# Patient Record
Sex: MALE | Race: WHITE | NOT HISPANIC OR LATINO | URBAN - METROPOLITAN AREA
[De-identification: names, ages, dates, MRNs, and addresses within clinical notes are randomized per-mention and may not be internally consistent; named-entity substitution may affect disease eponyms.]

---

## 2018-02-25 ENCOUNTER — EMERGENCY (EMERGENCY)
Facility: HOSPITAL | Age: 68
LOS: 1 days | Discharge: HOME | End: 2018-02-25
Attending: EMERGENCY MEDICINE | Admitting: EMERGENCY MEDICINE
Payer: COMMERCIAL

## 2018-02-25 VITALS
DIASTOLIC BLOOD PRESSURE: 69 MMHG | HEART RATE: 11 BPM | OXYGEN SATURATION: 95 % | TEMPERATURE: 99 F | RESPIRATION RATE: 22 BRPM | SYSTOLIC BLOOD PRESSURE: 127 MMHG | WEIGHT: 195.11 LBS

## 2018-02-25 VITALS — RESPIRATION RATE: 15 BRPM | OXYGEN SATURATION: 95 % | TEMPERATURE: 99 F

## 2018-02-25 LAB
APPEARANCE UR: CLEAR — SIGNIFICANT CHANGE UP
BACTERIA # UR AUTO: ABNORMAL /HPF
BILIRUB UR-MCNC: NEGATIVE — SIGNIFICANT CHANGE UP
COLOR SPEC: YELLOW — SIGNIFICANT CHANGE UP
DIFF PNL FLD: ABNORMAL
FLUAV H1 2009 PAND RNA SPEC QL NAA+PROBE: DETECTED
GLUCOSE UR QL: 50 MG/DL
HYALINE CASTS # UR AUTO: ABNORMAL
KETONES UR-MCNC: NEGATIVE — SIGNIFICANT CHANGE UP
LEUKOCYTE ESTERASE UR-ACNC: NEGATIVE — SIGNIFICANT CHANGE UP
NITRITE UR-MCNC: NEGATIVE — SIGNIFICANT CHANGE UP
PH UR: 6 — SIGNIFICANT CHANGE UP (ref 5–8)
PROT UR-MCNC: 300 MG/DL
RAPID RVP RESULT: DETECTED
RBC CASTS # UR COMP ASSIST: SIGNIFICANT CHANGE UP /HPF (ref 0–2)
SP GR SPEC: 1.02 — SIGNIFICANT CHANGE UP (ref 1.01–1.02)
UROBILINOGEN FLD QL: NEGATIVE — SIGNIFICANT CHANGE UP
WBC UR QL: SIGNIFICANT CHANGE UP /HPF (ref 0–5)

## 2018-02-25 PROCEDURE — 81001 URINALYSIS AUTO W/SCOPE: CPT

## 2018-02-25 PROCEDURE — 71046 X-RAY EXAM CHEST 2 VIEWS: CPT | Mod: 26

## 2018-02-25 PROCEDURE — 94640 AIRWAY INHALATION TREATMENT: CPT

## 2018-02-25 PROCEDURE — 87581 M.PNEUMON DNA AMP PROBE: CPT

## 2018-02-25 PROCEDURE — 71046 X-RAY EXAM CHEST 2 VIEWS: CPT

## 2018-02-25 PROCEDURE — 82962 GLUCOSE BLOOD TEST: CPT

## 2018-02-25 PROCEDURE — 87633 RESP VIRUS 12-25 TARGETS: CPT

## 2018-02-25 PROCEDURE — 96372 THER/PROPH/DIAG INJ SC/IM: CPT

## 2018-02-25 PROCEDURE — 87486 CHLMYD PNEUM DNA AMP PROBE: CPT

## 2018-02-25 PROCEDURE — 99283 EMERGENCY DEPT VISIT LOW MDM: CPT | Mod: 25

## 2018-02-25 PROCEDURE — 99285 EMERGENCY DEPT VISIT HI MDM: CPT

## 2018-02-25 PROCEDURE — 87798 DETECT AGENT NOS DNA AMP: CPT

## 2018-02-25 RX ORDER — IPRATROPIUM/ALBUTEROL SULFATE 18-103MCG
3 AEROSOL WITH ADAPTER (GRAM) INHALATION ONCE
Qty: 0 | Refills: 0 | Status: COMPLETED | OUTPATIENT
Start: 2018-02-25 | End: 2018-02-25

## 2018-02-25 RX ORDER — IBUPROFEN 200 MG
600 TABLET ORAL ONCE
Qty: 0 | Refills: 0 | Status: COMPLETED | OUTPATIENT
Start: 2018-02-25 | End: 2018-02-25

## 2018-02-25 RX ORDER — ACETAMINOPHEN 500 MG
975 TABLET ORAL ONCE
Qty: 0 | Refills: 0 | Status: COMPLETED | OUTPATIENT
Start: 2018-02-25 | End: 2018-02-25

## 2018-02-25 RX ORDER — ALBUTEROL 90 UG/1
1 AEROSOL, METERED ORAL ONCE
Qty: 0 | Refills: 0 | Status: COMPLETED | OUTPATIENT
Start: 2018-02-25 | End: 2018-02-25

## 2018-02-25 RX ADMIN — Medication 3 MILLILITER(S): at 12:57

## 2018-02-25 RX ADMIN — Medication 975 MILLIGRAM(S): at 14:14

## 2018-02-25 RX ADMIN — ALBUTEROL 1 PUFF(S): 90 AEROSOL, METERED ORAL at 14:14

## 2018-02-25 RX ADMIN — Medication 100 MILLIGRAM(S): at 12:56

## 2018-02-25 RX ADMIN — Medication 600 MILLIGRAM(S): at 14:12

## 2018-02-25 RX ADMIN — Medication 600 MILLIGRAM(S): at 11:40

## 2018-02-25 RX ADMIN — Medication 75 MILLIGRAM(S): at 14:54

## 2018-02-25 NOTE — ED PROVIDER NOTE - PROGRESS NOTE DETAILS
pt found to be febrile rectally. will treat with anti-pyretics. finger stick within normal limits. following chest x-ray and UA, will reassess. Possible consolidation of RLL.  Symptoms improved with antipyretics and nebulizer.  to be discharged on doxycycline.  pmd follow-up.  return precautions given.  Advised to closely monitor FS at home and to return for elevated readings.      MARINO Sparks MD pt found to be febrile rectally. will treat with anti-pyretics. finger stick <200. following chest x-ray and UA, will reassess. Pt found to flu positive.  will treat with tamiflu.  pt approved clinically.  discussed disposition.  pt would like to be discharged with previously discussed meds. strict FS monitoring, PMD follow-up and return precautions.

## 2018-02-25 NOTE — ED ADULT NURSE NOTE - DISCHARGE TEACHING
follow up with pcp. Return for worsening s.s. Patient and son verbalized understanding of d.c instructions.

## 2018-02-25 NOTE — ED PROVIDER NOTE - MEDICAL DECISION MAKING DETAILS
68 yo m with hx of htn, hdl, dm on metformin coming in with non productive cough, congestion, diffuse myalgias, and subjective fever. found to be febrile in ED. wife at home with flu. likely viral URI. will obtain chest x-ray to r/o pneumonia. finger stick, ua, and anti pyretics and reassess.

## 2018-02-25 NOTE — ED PROVIDER NOTE - CARE PLAN
Principal Discharge DX:	PNA (pneumonia)  Assessment and plan of treatment:	Complete course of antibiotic as prescribed.  Use albuterol inhaled 1-2 puffs every 4-6 hours as needed for cough/shortness of breath.  Stay well-hydrated.  check finger sticks regulalry.  Follow-up with your doctor.  Return for new/worsening symptoms including increased weakness, lethargy, change in mental status, or any other concerns.

## 2018-02-25 NOTE — ED PROVIDER NOTE - OBJECTIVE STATEMENT
68 yo m with pmhx of dm and htn presents with cough that started 2-3 days ago. also has fever, chills, and headache. feels like he has chest congestion. denies chest pain, sob, nausea, vomiting, urinary problems. 66 yo m with pmhx of dm and htn presents with cough that started 2-3 days ago. also has subjective fever, chills, and mild frontal headache. +mylagias.  feels like he has chest congestion. +non-productive cough. denies chest pain, sob, nausea, vomiting, urinary problems.  Tylenol this morning at approx 8AM with some releif.

## 2018-02-25 NOTE — ED PROVIDER NOTE - PLAN OF CARE
Complete course of antibiotic as prescribed.  Use albuterol inhaled 1-2 puffs every 4-6 hours as needed for cough/shortness of breath.  Stay well-hydrated.  check finger sticks regulalry.  Follow-up with your doctor.  Return for new/worsening symptoms including increased weakness, lethargy, change in mental status, or any other concerns.

## 2018-02-25 NOTE — ED ADULT NURSE NOTE - OBJECTIVE STATEMENT
66 y/o male presenting to the ED via walking in complaining of body aches, intermittent fevers, and runny nose x days. Spouse diagnosed with flu. Patient denies decrease in PO intake. Patient denies chest pain, dizziness, n/v/d, urinary s.s., numbness, tingling. Positive dry and non-productive cough. Last Advil at 8am. Hx Htn and DM. a&ox3. Safety and comfort measures provided. Son at bedside.

## 2018-02-25 NOTE — ED PROVIDER NOTE - CONSTITUTIONAL, MLM
normal... Well appearing, well nourished, awake, alert, oriented to person, place, time/situation and in no apparent distress. well nourished, awake, alert, oriented to person, place, time/situation and in no apparent distress.

## 2018-09-24 ENCOUNTER — INPATIENT (INPATIENT)
Facility: HOSPITAL | Age: 68
LOS: 6 days | Discharge: HOME | DRG: 693 | End: 2018-10-01
Attending: INTERNAL MEDICINE | Admitting: INTERNAL MEDICINE
Payer: COMMERCIAL

## 2018-09-24 VITALS
OXYGEN SATURATION: 97 % | SYSTOLIC BLOOD PRESSURE: 186 MMHG | DIASTOLIC BLOOD PRESSURE: 83 MMHG | RESPIRATION RATE: 18 BRPM | HEART RATE: 98 BPM | WEIGHT: 199.96 LBS | TEMPERATURE: 99 F | HEIGHT: 68 IN

## 2018-09-24 LAB
ALBUMIN SERPL ELPH-MCNC: 4.4 G/DL — SIGNIFICANT CHANGE UP (ref 3.3–5)
ALP SERPL-CCNC: 101 U/L — SIGNIFICANT CHANGE UP (ref 40–120)
ALT FLD-CCNC: 18 U/L — SIGNIFICANT CHANGE UP (ref 10–45)
ANION GAP SERPL CALC-SCNC: 12 MMOL/L — SIGNIFICANT CHANGE UP (ref 5–17)
APPEARANCE UR: CLEAR — SIGNIFICANT CHANGE UP
AST SERPL-CCNC: 17 U/L — SIGNIFICANT CHANGE UP (ref 10–40)
BACTERIA # UR AUTO: NEGATIVE — SIGNIFICANT CHANGE UP
BASOPHILS # BLD AUTO: 0.1 K/UL — SIGNIFICANT CHANGE UP (ref 0–0.2)
BASOPHILS NFR BLD AUTO: 0.8 % — SIGNIFICANT CHANGE UP (ref 0–2)
BILIRUB SERPL-MCNC: 0.3 MG/DL — SIGNIFICANT CHANGE UP (ref 0.2–1.2)
BILIRUB UR-MCNC: NEGATIVE — SIGNIFICANT CHANGE UP
BUN SERPL-MCNC: 17 MG/DL — SIGNIFICANT CHANGE UP (ref 7–23)
CALCIUM SERPL-MCNC: 9.7 MG/DL — SIGNIFICANT CHANGE UP (ref 8.4–10.5)
CHLORIDE SERPL-SCNC: 104 MMOL/L — SIGNIFICANT CHANGE UP (ref 96–108)
CO2 SERPL-SCNC: 22 MMOL/L — SIGNIFICANT CHANGE UP (ref 22–31)
COLOR SPEC: SIGNIFICANT CHANGE UP
CREAT SERPL-MCNC: 0.91 MG/DL — SIGNIFICANT CHANGE UP (ref 0.5–1.3)
DIFF PNL FLD: ABNORMAL
EOSINOPHIL # BLD AUTO: 0.1 K/UL — SIGNIFICANT CHANGE UP (ref 0–0.5)
EOSINOPHIL NFR BLD AUTO: 0.6 % — SIGNIFICANT CHANGE UP (ref 0–6)
EPI CELLS # UR: 0 /HPF — SIGNIFICANT CHANGE UP
GAS PNL BLDV: SIGNIFICANT CHANGE UP
GLUCOSE SERPL-MCNC: 186 MG/DL — HIGH (ref 70–99)
GLUCOSE UR QL: ABNORMAL
HCT VFR BLD CALC: 39.2 % — SIGNIFICANT CHANGE UP (ref 39–50)
HGB BLD-MCNC: 12.9 G/DL — LOW (ref 13–17)
HYALINE CASTS # UR AUTO: 0 /LPF — SIGNIFICANT CHANGE UP (ref 0–2)
KETONES UR-MCNC: NEGATIVE — SIGNIFICANT CHANGE UP
LEUKOCYTE ESTERASE UR-ACNC: NEGATIVE — SIGNIFICANT CHANGE UP
LYMPHOCYTES # BLD AUTO: 1.7 K/UL — SIGNIFICANT CHANGE UP (ref 1–3.3)
LYMPHOCYTES # BLD AUTO: 14.4 % — SIGNIFICANT CHANGE UP (ref 13–44)
MCHC RBC-ENTMCNC: 29.6 PG — SIGNIFICANT CHANGE UP (ref 27–34)
MCHC RBC-ENTMCNC: 33 GM/DL — SIGNIFICANT CHANGE UP (ref 32–36)
MCV RBC AUTO: 89.9 FL — SIGNIFICANT CHANGE UP (ref 80–100)
MONOCYTES # BLD AUTO: 0.8 K/UL — SIGNIFICANT CHANGE UP (ref 0–0.9)
MONOCYTES NFR BLD AUTO: 7.3 % — SIGNIFICANT CHANGE UP (ref 2–14)
NEUTROPHILS # BLD AUTO: 8.8 K/UL — HIGH (ref 1.8–7.4)
NEUTROPHILS NFR BLD AUTO: 76.9 % — SIGNIFICANT CHANGE UP (ref 43–77)
NITRITE UR-MCNC: NEGATIVE — SIGNIFICANT CHANGE UP
PH UR: 6 — SIGNIFICANT CHANGE UP (ref 5–8)
PLATELET # BLD AUTO: 304 K/UL — SIGNIFICANT CHANGE UP (ref 150–400)
POTASSIUM SERPL-MCNC: 4.4 MMOL/L — SIGNIFICANT CHANGE UP (ref 3.5–5.3)
POTASSIUM SERPL-SCNC: 4.4 MMOL/L — SIGNIFICANT CHANGE UP (ref 3.5–5.3)
PROT SERPL-MCNC: 7.8 G/DL — SIGNIFICANT CHANGE UP (ref 6–8.3)
PROT UR-MCNC: ABNORMAL
RBC # BLD: 4.36 M/UL — SIGNIFICANT CHANGE UP (ref 4.2–5.8)
RBC # FLD: 12.8 % — SIGNIFICANT CHANGE UP (ref 10.3–14.5)
RBC CASTS # UR COMP ASSIST: 31 /HPF — HIGH (ref 0–4)
SODIUM SERPL-SCNC: 138 MMOL/L — SIGNIFICANT CHANGE UP (ref 135–145)
SP GR SPEC: 1.02 — SIGNIFICANT CHANGE UP
UROBILINOGEN FLD QL: NEGATIVE — SIGNIFICANT CHANGE UP
WBC # BLD: 11.5 K/UL — HIGH (ref 3.8–10.5)
WBC # FLD AUTO: 11.5 K/UL — HIGH (ref 3.8–10.5)
WBC UR QL: 2 /HPF — SIGNIFICANT CHANGE UP (ref 0–5)

## 2018-09-24 PROCEDURE — 99218: CPT

## 2018-09-24 RX ORDER — MORPHINE SULFATE 50 MG/1
4 CAPSULE, EXTENDED RELEASE ORAL ONCE
Qty: 0 | Refills: 0 | Status: DISCONTINUED | OUTPATIENT
Start: 2018-09-24 | End: 2018-09-24

## 2018-09-24 RX ORDER — TAMSULOSIN HYDROCHLORIDE 0.4 MG/1
0.4 CAPSULE ORAL AT BEDTIME
Qty: 0 | Refills: 0 | Status: DISCONTINUED | OUTPATIENT
Start: 2018-09-24 | End: 2018-10-01

## 2018-09-24 RX ORDER — METOCLOPRAMIDE HCL 10 MG
5 TABLET ORAL ONCE
Qty: 0 | Refills: 0 | Status: COMPLETED | OUTPATIENT
Start: 2018-09-24 | End: 2018-09-24

## 2018-09-24 RX ORDER — KETOROLAC TROMETHAMINE 30 MG/ML
15 SYRINGE (ML) INJECTION ONCE
Qty: 0 | Refills: 0 | Status: DISCONTINUED | OUTPATIENT
Start: 2018-09-24 | End: 2018-09-24

## 2018-09-24 RX ORDER — ONDANSETRON 8 MG/1
4 TABLET, FILM COATED ORAL ONCE
Qty: 0 | Refills: 0 | Status: DISCONTINUED | OUTPATIENT
Start: 2018-09-24 | End: 2018-09-24

## 2018-09-24 RX ORDER — SODIUM CHLORIDE 9 MG/ML
3 INJECTION INTRAMUSCULAR; INTRAVENOUS; SUBCUTANEOUS EVERY 8 HOURS
Qty: 0 | Refills: 0 | Status: DISCONTINUED | OUTPATIENT
Start: 2018-09-24 | End: 2018-10-01

## 2018-09-24 RX ORDER — ONDANSETRON 8 MG/1
4 TABLET, FILM COATED ORAL ONCE
Qty: 0 | Refills: 0 | Status: COMPLETED | OUTPATIENT
Start: 2018-09-24 | End: 2018-09-24

## 2018-09-24 RX ORDER — SODIUM CHLORIDE 9 MG/ML
1000 INJECTION INTRAMUSCULAR; INTRAVENOUS; SUBCUTANEOUS ONCE
Qty: 0 | Refills: 0 | Status: COMPLETED | OUTPATIENT
Start: 2018-09-24 | End: 2018-09-24

## 2018-09-24 RX ADMIN — MORPHINE SULFATE 4 MILLIGRAM(S): 50 CAPSULE, EXTENDED RELEASE ORAL at 21:00

## 2018-09-24 RX ADMIN — MORPHINE SULFATE 4 MILLIGRAM(S): 50 CAPSULE, EXTENDED RELEASE ORAL at 22:34

## 2018-09-24 RX ADMIN — MORPHINE SULFATE 4 MILLIGRAM(S): 50 CAPSULE, EXTENDED RELEASE ORAL at 21:38

## 2018-09-24 RX ADMIN — SODIUM CHLORIDE 1000 MILLILITER(S): 9 INJECTION INTRAMUSCULAR; INTRAVENOUS; SUBCUTANEOUS at 20:31

## 2018-09-24 RX ADMIN — ONDANSETRON 4 MILLIGRAM(S): 8 TABLET, FILM COATED ORAL at 20:31

## 2018-09-24 RX ADMIN — MORPHINE SULFATE 4 MILLIGRAM(S): 50 CAPSULE, EXTENDED RELEASE ORAL at 20:31

## 2018-09-24 RX ADMIN — TAMSULOSIN HYDROCHLORIDE 0.4 MILLIGRAM(S): 0.4 CAPSULE ORAL at 23:55

## 2018-09-24 RX ADMIN — MORPHINE SULFATE 4 MILLIGRAM(S): 50 CAPSULE, EXTENDED RELEASE ORAL at 21:07

## 2018-09-24 RX ADMIN — Medication 5 MILLIGRAM(S): at 22:34

## 2018-09-24 RX ADMIN — Medication 15 MILLIGRAM(S): at 22:35

## 2018-09-24 NOTE — ED CDU PROVIDER INITIAL DAY NOTE - OBJECTIVE STATEMENT
68M HTN, HLD, DM h/o renal stones requiring a stent and lithotripsy p/w flank pain for 1 day. Pt reports last night he began to feel and sore/sharp flank pain in his L flank with radiation to his groin. Pain waxes and wanes in intensity, currently 8/10. Took tylenol at 12pm advil at 4pm w/o relief. Reports some subjective fevers in the afternoon and also associated nausea. No CP, SOB, no dysuria or urgency. 68M HTN, HLD, DM h/o renal stones requiring a stent and lithotripsy p/w flank pain for 1 day. Pt reports last night he began to feel and sore/sharp flank pain in his L flank with radiation to his groin. Pain waxes and wanes in intensity, currently 8/10. Took tylenol at 12pm advil at 4pm w/o relief. Reports some subjective fevers in the afternoon and also associated nausea. No CP, SOB, no dysuria or urgency.  In ED, patient had laboratory significant for mild leukocytosis 11.5, UA with blood and Abdominal/pelvis CT showing Mild left hydroureteronephrosis and perinephric fat stranding caused by an approximately 5 x 3 mm stone located in the distal left ureter, immediately proximal to the uterovesical junction. Pt was evaluated by Urology and sent to CDU for pain control, IVF, Flomax, frequent evaluation, repeat laboratory, NPO for possible stent placement.

## 2018-09-24 NOTE — ED PROVIDER NOTE - ATTENDING CONTRIBUTION TO CARE
Private Physician [facility]  68y male pmh HTN,HLD,DMT2, Renal colic. pt comes to ed complains of pain left flank cw prior renal stone. Nausea with dry heaves, no hematuria, felt warm without fever, pain no rad, Onset last night. 5/10, PE WDWN male looking acutely ill normocephalic atraumatic chest clear cv no rubs, gallops or murmurs,abd left cvat mod, no ant abd ttp, no rebound guarding, neuro no focal defects. cv no rmg  Bautista Negron MD, Facep

## 2018-09-24 NOTE — ED PROVIDER NOTE - OBJECTIVE STATEMENT
68M HTN, HLD, DM h/o renal stones requiring a stent and lithotripsy p/w flank pain for 1 day. Pt reports last night he began to feel and sore/sharp flank pain in his L flank with radiation to his groin. Pain waxes and wanes in intensity, currently 8/10. Took tylenol at 12pm advil at 4pm w/o relief. Reports some subjective fevers in the afternoon and also associated nausea. No CP, SOB, no dysuria or urgency.

## 2018-09-24 NOTE — ED PROVIDER NOTE - NS ED ROS FT
Constitution: +subjective fevers   Eyes: No visual changes  HEENT: No URI symptoms  Cardio: No Chest pain  Resp: No SOB  GI: + nausea, no abdominal pain  : +flank pain, no dysuria  MSK: No Back pain  Neuro: No Headache  Skin: No rashes  Chauncey Collins, PGY-1

## 2018-09-24 NOTE — ED PROVIDER NOTE - MEDICAL DECISION MAKING DETAILS
Flank pain with hx of renal colic ro return, Check labs,ct stone hunt, labs, re-asssess  Bautista Negron MD, Facep

## 2018-09-24 NOTE — CONSULT NOTE ADULT - ASSESSMENT
renal colic secondary to 5mm left distal ureteral calculi    Rec:  -observe in cdu  -pain control  -flomax 0.4mg qd  -strain urine  -send UCx  -npo after midnight  -repeat labs in am  -will reevaluate on am rounds (~6a-7a)  discussed with urology attending G. Goldberg MD

## 2018-09-24 NOTE — ED ADULT NURSE NOTE - OBJECTIVE STATEMENT
67 yo M pmh of HTN, DM, renal calculi with lithotripsy in the past came to ED c/o left sided flank pain with associated nausea x 1 day.  Pt states that it feels like the last time he had a kidney stone.  Pt has complaints of polyuria as well.   Denies chest pain, back pain, SOB, fevers/chills, v/d, lightheadedness, dizziness, changes in bowel habits.  A&Ox4, abdomen obese, nondistended, nontender, bowel sounds present.  Skin w/d/i.  VSS, pt hypertensive, MD aware.  +peripheral pulses.  Safety and comfort maintained. Wife present at bedside. Will continue to monitor.

## 2018-09-24 NOTE — ED ADULT TRIAGE NOTE - CHIEF COMPLAINT QUOTE
Pt has hx kidney stones, always on the L, x over 20 years, once required lithotripsy.  Pt c/o L flank pain, fevers, chills, nausea since yesterday at midnight. Pt has been trying to increase his water intake to "flush it out."  Pt also c/o urinary frequency. No burning, dysuria.  Pt has hx HTN, did not take his BP meds today due to nausea.

## 2018-09-24 NOTE — ED PROVIDER NOTE - PHYSICAL EXAMINATION
General: Alert and Orientated x 3. No apparent distress.  HEENT: Normocephalic and atraumatic, PERRL with EOMI, Trachea midline.   Cardiac: Normal S1 and S2 w/ RRR. No murmurs appreciated. No JVD appreciated.  Pulmonary: Vesicular breath sounds bilaterally. No increased WOB. No wheezes or crackles.  Abdominal: Soft, non-tender. (+) bowel sounds appreciated in all 4 quadrants. No hepatosplenomegaly.   : L CVA tenderness   Neurologic: No focal sensory or motor deficits.  Musculoskeletal: Strength appropriate in all 4 extremities for age with no limited ROM.  Vascular: Distal pulses 2+ in lower extremity   Skin: Color appropriate for race. Intact, warm, and well-perfused.  Psychiatric: Appropriate mood and affect. No apparent risk to self or others.  Chauncey Collins, PGY-1

## 2018-09-24 NOTE — ED CDU PROVIDER INITIAL DAY NOTE - OBSERVATION MONITORING PLAN
ED Record Reviewed PMD Contacted/ED Record Reviewed PMD Contacted/ED Record Reviewed/Urologist Dr. Sultan Cooper

## 2018-09-24 NOTE — CONSULT NOTE ADULT - SUBJECTIVE AND OBJECTIVE BOX
HPI:  Patient is a 68y Male PMHx DM, nephrolithiasis presents to ED c/o left flank pain x 1 day.   pt with hx of kidney stones, followed by [facility] ([phone]).  has passed a stone spontaneously as well as needed surgical intervention in the past.  No fever, +chills, +nausea, no emesis.  Denies dysuria +frequency      PAST MEDICAL & SURGICAL HISTORY:  Renal colic  Hypertension  Hypercholesteremia  Diabetes    No Known Allergies    Intolerances    REVIEW OF SYSTEMS: Pertinent positives and negatives as stated in HPI, otherwise negative    Vital signs  T(C): 37.1 (18 @ 18:53), Max: 37.1 (18 @ 18:53)  HR: 69 (18 @ 22:32)  BP: 180/83 (18 @ 22:32)  SpO2: 98% (18 @ 22:32)      Physical Exam  Gen: NAD  Pulm: No intercostal retractions  Back: No CVAT b/l  Abd: Soft, NT, ND  : wnl    LABS:     @ 20:45    WBC 11.5  / Hct 39.2  / SCr 0.91         138  |  104  |  17  ----------------------------<  186<H>  4.4   |  22  |  0.91    Ca    9.7      24 Sep 2018 20:45    TPro  7.8  /  Alb  4.4  /  TBili  0.3  /  DBili  x   /  AST  17  /  ALT  18  /  AlkPhos  101        Urinalysis Basic - ( 24 Sep 2018 20:45 )    Color: Light Yellow / Appearance: Clear / S.016 / pH: x  Gluc: x / Ketone: Negative  / Bili: Negative / Urobili: Negative   Blood: x / Protein: 30 mg/dL / Nitrite: Negative   Leuk Esterase: Negative / RBC: 31 /hpf / WBC 2 /hpf   Sq Epi: x / Non Sq Epi: 0 /hpf / Bacteria: Negative      RADIOLOGY:    EXAM:  CT ABDOMEN AND PELVIS                          PROCEDURE DATE:  2018        INTERPRETATION:  CLINICAL INFORMATION:  Left flank pain.  History of   renal stones.    TECHNIQUE:   Axial CT images were acquired through the abdomen and pelvis   Intravenous contrast:  None.  Oral contrast: None.   Coronal and sagittal reformats were generated.     COMPARISON STUDY:  2016.    FINDINGS:  Visualized lower chest: Atherosclerotic calcification of the coronary   arteries.  Mild bilateral gynecomastia.    Liver: Right hepatic lobe measures 21-20 cm in length, unchanged from   2016.  Bile ducts: Normal caliber.  Gallbladder: Within normal limits.  Spleen: Within normal limits.  Pancreas: Within normal limits.  Adrenal glands: Within normal limits.  Kidneys/ureters: Mild left hydroureteronephrosis and perinephric fat   stranding caused by an approximately 5 x 3 mm stone located in the distal   left ureter, immediately proximal to uterovesical junction.  A 3 mm   calcific focus in the right lower pole (2:56) likely reflects a   nonobstructing renal stone.    Bladder: Within normal limits.  Reproductive organs: The prostate measures approximately 4.3 x 5.5 x 4.5   cm.      Bowel: Small hiatal hernia.  No bowel obstruction.  Normal appendix.    Mild sigmoid diverticulosis.  Peritoneum: No ascites.    Retroperitoneum: Lymphadenopathy.  Vasculature: Scattered atherosclerotic calcifications of the aortoiliac   tree.    Abdominal wall/soft tissues: Within normal limits.  Bones: An approximately 3.2 x 1.1 cm sclerotic focus in the right ilium   (2:91) is grossly stable from 2016.  Degenerative changes in the   spine; no clinically significant spinal canal stenosis is visualized by   CT technique.  Mild bilateral neural foraminal narrowing at L2-L3 through   L5-S1.      IMPRESSION:  Mild left hydroureteronephrosis and perinephric fat stranding caused by   an approximately 5 x 3 mm stone located in the distal left ureter,   immediately proximal to the uterovesical junction.  Superimposed   genitourinary infection should be excluded based on clinical symptoms and   laboratory values.      KAREN LOPEZ M.D.,ATTENDING RADIOLOGIST  This document has been electronically signed. Sep 24 2018  8:41PM

## 2018-09-24 NOTE — ED CDU PROVIDER INITIAL DAY NOTE - ATTENDING CONTRIBUTION TO CARE
I have personally performed a face to face diagnostic evaluation on this patient.  I have reviewed the ACP note and agree with the history, exam, and plan of care, except as noted.  History and Exam by me shows  See ED provider note  Bautista Negron MD, Facep

## 2018-09-25 DIAGNOSIS — N23 UNSPECIFIED RENAL COLIC: ICD-10-CM

## 2018-09-25 DIAGNOSIS — I10 ESSENTIAL (PRIMARY) HYPERTENSION: ICD-10-CM

## 2018-09-25 DIAGNOSIS — E78.00 PURE HYPERCHOLESTEROLEMIA, UNSPECIFIED: ICD-10-CM

## 2018-09-25 DIAGNOSIS — I25.10 ATHEROSCLEROTIC HEART DISEASE OF NATIVE CORONARY ARTERY WITHOUT ANGINA PECTORIS: ICD-10-CM

## 2018-09-25 DIAGNOSIS — N20.1 CALCULUS OF URETER: ICD-10-CM

## 2018-09-25 DIAGNOSIS — E11.9 TYPE 2 DIABETES MELLITUS WITHOUT COMPLICATIONS: ICD-10-CM

## 2018-09-25 LAB
ANION GAP SERPL CALC-SCNC: 9 MMOL/L — SIGNIFICANT CHANGE UP (ref 5–17)
BASOPHILS # BLD AUTO: 0 K/UL — SIGNIFICANT CHANGE UP (ref 0–0.2)
BASOPHILS NFR BLD AUTO: 0.3 % — SIGNIFICANT CHANGE UP (ref 0–2)
BUN SERPL-MCNC: 17 MG/DL — SIGNIFICANT CHANGE UP (ref 7–23)
CALCIUM SERPL-MCNC: 9.1 MG/DL — SIGNIFICANT CHANGE UP (ref 8.4–10.5)
CHLORIDE SERPL-SCNC: 106 MMOL/L — SIGNIFICANT CHANGE UP (ref 96–108)
CO2 SERPL-SCNC: 24 MMOL/L — SIGNIFICANT CHANGE UP (ref 22–31)
CREAT SERPL-MCNC: 1.02 MG/DL — SIGNIFICANT CHANGE UP (ref 0.5–1.3)
EOSINOPHIL # BLD AUTO: 0.1 K/UL — SIGNIFICANT CHANGE UP (ref 0–0.5)
EOSINOPHIL NFR BLD AUTO: 0.6 % — SIGNIFICANT CHANGE UP (ref 0–6)
GLUCOSE BLDC GLUCOMTR-MCNC: 136 MG/DL — HIGH (ref 70–99)
GLUCOSE BLDC GLUCOMTR-MCNC: 146 MG/DL — HIGH (ref 70–99)
GLUCOSE BLDC GLUCOMTR-MCNC: 150 MG/DL — HIGH (ref 70–99)
GLUCOSE BLDC GLUCOMTR-MCNC: 153 MG/DL — HIGH (ref 70–99)
GLUCOSE BLDC GLUCOMTR-MCNC: 197 MG/DL — HIGH (ref 70–99)
GLUCOSE SERPL-MCNC: 141 MG/DL — HIGH (ref 70–99)
HCT VFR BLD CALC: 34.4 % — LOW (ref 39–50)
HGB BLD-MCNC: 11.3 G/DL — LOW (ref 13–17)
LYMPHOCYTES # BLD AUTO: 1.4 K/UL — SIGNIFICANT CHANGE UP (ref 1–3.3)
LYMPHOCYTES # BLD AUTO: 15.5 % — SIGNIFICANT CHANGE UP (ref 13–44)
MCHC RBC-ENTMCNC: 30 PG — SIGNIFICANT CHANGE UP (ref 27–34)
MCHC RBC-ENTMCNC: 33 GM/DL — SIGNIFICANT CHANGE UP (ref 32–36)
MCV RBC AUTO: 91.1 FL — SIGNIFICANT CHANGE UP (ref 80–100)
MONOCYTES # BLD AUTO: 0.7 K/UL — SIGNIFICANT CHANGE UP (ref 0–0.9)
MONOCYTES NFR BLD AUTO: 8 % — SIGNIFICANT CHANGE UP (ref 2–14)
NEUTROPHILS # BLD AUTO: 6.9 K/UL — SIGNIFICANT CHANGE UP (ref 1.8–7.4)
NEUTROPHILS NFR BLD AUTO: 75.5 % — SIGNIFICANT CHANGE UP (ref 43–77)
PLATELET # BLD AUTO: 232 K/UL — SIGNIFICANT CHANGE UP (ref 150–400)
POTASSIUM SERPL-MCNC: 4.2 MMOL/L — SIGNIFICANT CHANGE UP (ref 3.5–5.3)
POTASSIUM SERPL-SCNC: 4.2 MMOL/L — SIGNIFICANT CHANGE UP (ref 3.5–5.3)
RBC # BLD: 3.77 M/UL — LOW (ref 4.2–5.8)
RBC # FLD: 12.6 % — SIGNIFICANT CHANGE UP (ref 10.3–14.5)
SODIUM SERPL-SCNC: 139 MMOL/L — SIGNIFICANT CHANGE UP (ref 135–145)
WBC # BLD: 9.1 K/UL — SIGNIFICANT CHANGE UP (ref 3.8–10.5)
WBC # FLD AUTO: 9.1 K/UL — SIGNIFICANT CHANGE UP (ref 3.8–10.5)

## 2018-09-25 PROCEDURE — 99217: CPT

## 2018-09-25 RX ORDER — DEXTROSE 50 % IN WATER 50 %
25 SYRINGE (ML) INTRAVENOUS ONCE
Qty: 0 | Refills: 0 | Status: DISCONTINUED | OUTPATIENT
Start: 2018-09-25 | End: 2018-10-01

## 2018-09-25 RX ORDER — INSULIN LISPRO 100/ML
VIAL (ML) SUBCUTANEOUS AT BEDTIME
Qty: 0 | Refills: 0 | Status: DISCONTINUED | OUTPATIENT
Start: 2018-09-25 | End: 2018-10-01

## 2018-09-25 RX ORDER — ASPIRIN/CALCIUM CARB/MAGNESIUM 324 MG
81 TABLET ORAL DAILY
Qty: 0 | Refills: 0 | Status: DISCONTINUED | OUTPATIENT
Start: 2018-09-25 | End: 2018-10-01

## 2018-09-25 RX ORDER — ASPIRIN/CALCIUM CARB/MAGNESIUM 324 MG
0 TABLET ORAL
Qty: 0 | Refills: 0 | COMMUNITY

## 2018-09-25 RX ORDER — ACETAMINOPHEN 500 MG
650 TABLET ORAL EVERY 6 HOURS
Qty: 0 | Refills: 0 | Status: DISCONTINUED | OUTPATIENT
Start: 2018-09-25 | End: 2018-10-01

## 2018-09-25 RX ORDER — ATORVASTATIN CALCIUM 80 MG/1
80 TABLET, FILM COATED ORAL AT BEDTIME
Qty: 0 | Refills: 0 | Status: DISCONTINUED | OUTPATIENT
Start: 2018-09-25 | End: 2018-10-01

## 2018-09-25 RX ORDER — METFORMIN HYDROCHLORIDE 850 MG/1
1000 TABLET ORAL
Qty: 0 | Refills: 0 | Status: DISCONTINUED | OUTPATIENT
Start: 2018-09-25 | End: 2018-09-25

## 2018-09-25 RX ORDER — OXYCODONE AND ACETAMINOPHEN 5; 325 MG/1; MG/1
1 TABLET ORAL EVERY 4 HOURS
Qty: 0 | Refills: 0 | Status: DISCONTINUED | OUTPATIENT
Start: 2018-09-25 | End: 2018-10-01

## 2018-09-25 RX ORDER — INSULIN LISPRO 100/ML
VIAL (ML) SUBCUTANEOUS
Qty: 0 | Refills: 0 | Status: DISCONTINUED | OUTPATIENT
Start: 2018-09-25 | End: 2018-10-01

## 2018-09-25 RX ORDER — GLUCAGON INJECTION, SOLUTION 0.5 MG/.1ML
1 INJECTION, SOLUTION SUBCUTANEOUS ONCE
Qty: 0 | Refills: 0 | Status: DISCONTINUED | OUTPATIENT
Start: 2018-09-25 | End: 2018-10-01

## 2018-09-25 RX ORDER — LISINOPRIL 2.5 MG/1
40 TABLET ORAL DAILY
Qty: 0 | Refills: 0 | Status: DISCONTINUED | OUTPATIENT
Start: 2018-09-25 | End: 2018-10-01

## 2018-09-25 RX ORDER — DEXTROSE 50 % IN WATER 50 %
15 SYRINGE (ML) INTRAVENOUS ONCE
Qty: 0 | Refills: 0 | Status: DISCONTINUED | OUTPATIENT
Start: 2018-09-25 | End: 2018-10-01

## 2018-09-25 RX ORDER — SODIUM CHLORIDE 9 MG/ML
1000 INJECTION INTRAMUSCULAR; INTRAVENOUS; SUBCUTANEOUS
Qty: 0 | Refills: 0 | Status: DISCONTINUED | OUTPATIENT
Start: 2018-09-25 | End: 2018-09-27

## 2018-09-25 RX ORDER — MORPHINE SULFATE 50 MG/1
2 CAPSULE, EXTENDED RELEASE ORAL EVERY 4 HOURS
Qty: 0 | Refills: 0 | Status: DISCONTINUED | OUTPATIENT
Start: 2018-09-25 | End: 2018-10-01

## 2018-09-25 RX ORDER — INFLUENZA VIRUS VACCINE 15; 15; 15; 15 UG/.5ML; UG/.5ML; UG/.5ML; UG/.5ML
0.5 SUSPENSION INTRAMUSCULAR ONCE
Qty: 0 | Refills: 0 | Status: COMPLETED | OUTPATIENT
Start: 2018-09-25 | End: 2018-10-01

## 2018-09-25 RX ORDER — DEXTROSE 50 % IN WATER 50 %
12.5 SYRINGE (ML) INTRAVENOUS ONCE
Qty: 0 | Refills: 0 | Status: DISCONTINUED | OUTPATIENT
Start: 2018-09-25 | End: 2018-10-01

## 2018-09-25 RX ORDER — AMLODIPINE BESYLATE 2.5 MG/1
0 TABLET ORAL
Qty: 0 | Refills: 0 | COMMUNITY

## 2018-09-25 RX ORDER — ONDANSETRON 8 MG/1
4 TABLET, FILM COATED ORAL ONCE
Qty: 0 | Refills: 0 | Status: COMPLETED | OUTPATIENT
Start: 2018-09-25 | End: 2018-09-25

## 2018-09-25 RX ORDER — SODIUM CHLORIDE 9 MG/ML
1000 INJECTION, SOLUTION INTRAVENOUS
Qty: 0 | Refills: 0 | Status: DISCONTINUED | OUTPATIENT
Start: 2018-09-25 | End: 2018-10-01

## 2018-09-25 RX ORDER — METOPROLOL TARTRATE 50 MG
25 TABLET ORAL DAILY
Qty: 0 | Refills: 0 | Status: DISCONTINUED | OUTPATIENT
Start: 2018-09-25 | End: 2018-10-01

## 2018-09-25 RX ORDER — GLIMEPIRIDE 1 MG
1 TABLET ORAL
Qty: 0 | Refills: 0 | COMMUNITY

## 2018-09-25 RX ORDER — AMLODIPINE BESYLATE 2.5 MG/1
10 TABLET ORAL DAILY
Qty: 0 | Refills: 0 | Status: DISCONTINUED | OUTPATIENT
Start: 2018-09-25 | End: 2018-10-01

## 2018-09-25 RX ADMIN — OXYCODONE AND ACETAMINOPHEN 1 TABLET(S): 5; 325 TABLET ORAL at 14:58

## 2018-09-25 RX ADMIN — LISINOPRIL 40 MILLIGRAM(S): 2.5 TABLET ORAL at 12:55

## 2018-09-25 RX ADMIN — SODIUM CHLORIDE 3 MILLILITER(S): 9 INJECTION INTRAMUSCULAR; INTRAVENOUS; SUBCUTANEOUS at 21:03

## 2018-09-25 RX ADMIN — OXYCODONE AND ACETAMINOPHEN 1 TABLET(S): 5; 325 TABLET ORAL at 12:54

## 2018-09-25 RX ADMIN — SODIUM CHLORIDE 3 MILLILITER(S): 9 INJECTION INTRAMUSCULAR; INTRAVENOUS; SUBCUTANEOUS at 05:52

## 2018-09-25 RX ADMIN — METFORMIN HYDROCHLORIDE 1000 MILLIGRAM(S): 850 TABLET ORAL at 09:06

## 2018-09-25 RX ADMIN — SODIUM CHLORIDE 200 MILLILITER(S): 9 INJECTION INTRAMUSCULAR; INTRAVENOUS; SUBCUTANEOUS at 00:32

## 2018-09-25 RX ADMIN — TAMSULOSIN HYDROCHLORIDE 0.4 MILLIGRAM(S): 0.4 CAPSULE ORAL at 21:02

## 2018-09-25 RX ADMIN — ONDANSETRON 4 MILLIGRAM(S): 8 TABLET, FILM COATED ORAL at 13:00

## 2018-09-25 RX ADMIN — METFORMIN HYDROCHLORIDE 1000 MILLIGRAM(S): 850 TABLET ORAL at 01:34

## 2018-09-25 RX ADMIN — AMLODIPINE BESYLATE 10 MILLIGRAM(S): 2.5 TABLET ORAL at 12:54

## 2018-09-25 RX ADMIN — ATORVASTATIN CALCIUM 80 MILLIGRAM(S): 80 TABLET, FILM COATED ORAL at 21:02

## 2018-09-25 RX ADMIN — LISINOPRIL 40 MILLIGRAM(S): 2.5 TABLET ORAL at 01:34

## 2018-09-25 RX ADMIN — SODIUM CHLORIDE 200 MILLILITER(S): 9 INJECTION INTRAMUSCULAR; INTRAVENOUS; SUBCUTANEOUS at 06:18

## 2018-09-25 RX ADMIN — SODIUM CHLORIDE 3 MILLILITER(S): 9 INJECTION INTRAMUSCULAR; INTRAVENOUS; SUBCUTANEOUS at 15:12

## 2018-09-25 RX ADMIN — Medication 25 MILLIGRAM(S): at 09:05

## 2018-09-25 RX ADMIN — AMLODIPINE BESYLATE 10 MILLIGRAM(S): 2.5 TABLET ORAL at 01:34

## 2018-09-25 NOTE — ED CDU PROVIDER SUBSEQUENT DAY NOTE - PROGRESS NOTE DETAILS
Case discussed with patient's Urologist Dr. Sultan Copoer made aware and agrees with plan. Pt resting, reports mild discomfort to left flank, declines pain medication. Will continue to monitor, IVF, over night. Pt resting comfortably, reports mild discomfort left flank, declines pain medication.   Will continue to monitor. Pt resting comfortably. NAD. No complaints. VSS. pt without pain, discussed admission per urology under Dr. Salazar, NPO at midnight incase of stent placement.

## 2018-09-25 NOTE — CONSULT NOTE ADULT - PROBLEM SELECTOR RECOMMENDATION 9
-Afebrile. UA neg for nitr. Can proceed with expulsive therapy for now  -Flomax 0.4mg daily  -Pain control  -Aggressive hydration  -Ambulation  -Admit to Dr. Salazar for medical expulsive management of stone and pain control  -Urology to reassess tomorrow--if refractory pain, will discuss renal decompression with cystoscopy and left ureteral stent placement  -If pt becomes febrile, please page urology

## 2018-09-25 NOTE — H&P ADULT - ASSESSMENT
68 m with  L kidney stone, Renal colic -IVF, pain control.  Urology follow.  Diabetes  - BS control  Hypercholesteremia  - continue meds  Hypertension -continue meds  Further action as per clinical course   Nils Salazar MD pager [phone]

## 2018-09-25 NOTE — CONSULT NOTE ADULT - SUBJECTIVE AND OBJECTIVE BOX
CHIEF COMPLAINT: Flank Pain     HISTORY OF PRESENT ILLNESS:  67 yo male with h/o renal stones requiring a stent and lithotripsy p/w flank pain for 1 day. Pt reports last night he began to feel and sore/sharp flank pain in his L flank with radiation to his groin. Pain waxes and wanes in intensity, currently 8/10. Took tylenol at 12pm advil at 4pm w/o relief. Reports some subjective fevers in the afternoon and also associated nausea. No CP, SOB, no dysuria or urgency.  CT revealed coronary calcification. He states to have exertional shortness of breath. He had a treadmill stress test this past summer with no further workup.       PAST MEDICAL & SURGICAL HISTORY:  Renal colic  Hypertension  Hypercholesteremia  Diabetes          MEDICATIONS:  amLODIPine   Tablet 10 milliGRAM(s) Oral daily  aspirin  chewable 81 milliGRAM(s) Oral daily  lisinopril 40 milliGRAM(s) Oral daily  metoprolol succinate ER 25 milliGRAM(s) Oral daily  tamsulosin 0.4 milliGRAM(s) Oral at bedtime        acetaminophen   Tablet .. 650 milliGRAM(s) Oral every 6 hours PRN  morphine  - Injectable 2 milliGRAM(s) IV Push every 4 hours PRN  oxyCODONE    5 mG/acetaminophen 325 mG 1 Tablet(s) Oral every 4 hours PRN      atorvastatin 80 milliGRAM(s) Oral at bedtime  dextrose 40% Gel 15 Gram(s) Oral once PRN  dextrose 50% Injectable 12.5 Gram(s) IV Push once  dextrose 50% Injectable 25 Gram(s) IV Push once  dextrose 50% Injectable 25 Gram(s) IV Push once  glucagon  Injectable 1 milliGRAM(s) IntraMuscular once PRN  insulin lispro (HumaLOG) corrective regimen sliding scale   SubCutaneous three times a day before meals  insulin lispro (HumaLOG) corrective regimen sliding scale   SubCutaneous at bedtime    dextrose 5%. 1000 milliLiter(s) IV Continuous <Continuous>  influenza   Vaccine 0.5 milliLiter(s) IntraMuscular once  sodium chloride 0.9% lock flush 3 milliLiter(s) IV Push every 8 hours  sodium chloride 0.9%. 1000 milliLiter(s) IV Continuous <Continuous>      FAMILY HISTORY:      SOCIAL HISTORY:    [ ] Non-smoker  [ ] Smoker  [ ] Alcohol    Allergies    No Known Allergies    Intolerances    	    REVIEW OF SYSTEMS:  CONSTITUTIONAL: No fever, weight loss, or fatigue  EYES: No eye pain, visual disturbances, or discharge  ENMT:  No difficulty hearing, tinnitus, vertigo; No sinus or throat pain  NECK: No pain or stiffness  RESPIRATORY: No cough, wheezing, chills or hemoptysis; + Shortness of Breath  CARDIOVASCULAR: No chest pain, palpitations, passing out, dizziness, or leg swelling  GASTROINTESTINAL: No abdominal or epigastric pain. No nausea, vomiting, or hematemesis; No diarrhea or constipation. No melena or hematochezia.  GENITOURINARY: No dysuria, frequency, hematuria, or incontinence +flank pain   NEUROLOGICAL: No headaches, memory loss, loss of strength, numbness, or tremors  SKIN: No itching, burning, rashes, or lesions   LYMPH Nodes: No enlarged glands  ENDOCRINE: No heat or cold intolerance; No hair loss  MUSCULOSKELETAL: + joint pain or swelling; No muscle, back, or extremity pain  PSYCHIATRIC: No depression, anxiety, mood swings, or difficulty sleeping  HEME/LYMPH: No easy bruising, or bleeding gums  ALLERY AND IMMUNOLOGIC: No hives or eczema	    [ ] All others negative	  [ ] Unable to obtain    PHYSICAL EXAM:  T(C): 36.8 (09-25-18 @ 15:11), Max: 36.9 (09-25-18 @ 08:01)  HR: 69 (09-25-18 @ 18:25) (63 - 80)  BP: 122/71 (09-25-18 @ 18:25) (117/63 - 189/91)  RR: 18 (09-25-18 @ 15:11) (18 - 18)  SpO2: 97% (09-25-18 @ 15:11) (94% - 98%)  Wt(kg): --  I&O's Summary    25 Sep 2018 07:01  -  25 Sep 2018 20:34  --------------------------------------------------------  IN: 240 mL / OUT: 200 mL / NET: 40 mL        Appearance: Normal	  HEENT:   Normal oral mucosa, PERRL, EOMI	  Lymphatic: No lymphadenopathy  Cardiovascular: Normal S1 S2, No JVD, No murmurs, No edema  Respiratory: Lungs clear to auscultation	  Psychiatry: A & O x 3, Mood & affect appropriate  Gastrointestinal:  Soft, Non-tender, + BS	  Skin: No rashes, No ecchymoses, No cyanosis	  Neurologic: Non-focal  Extremities: Normal range of motion, No clubbing, cyanosis or edema  Vascular: Peripheral pulses palpable 2+ bilaterally    TELEMETRY: 	    ECG:  	  RADIOLOGY:  < from: CT Abdomen and Pelvis No Cont (09.24.18 @ 20:18) >    EXAM:  CT ABDOMEN AND PELVIS                            PROCEDURE DATE:  09/24/2018            INTERPRETATION:  CLINICAL INFORMATION:  Left flank pain.  History of   renal stones.    TECHNIQUE:   Axial CT images were acquired through the abdomen and pelvis   Intravenous contrast:  None.  Oral contrast: None.   Coronal and sagittal reformats were generated.     COMPARISON STUDY:  04/12/2016.    FINDINGS:  Visualized lower chest: Atherosclerotic calcification of the coronary   arteries.  Mild bilateral gynecomastia.    Liver: Right hepatic lobe measures 21-20 cm in length, unchanged from   04/12/2016.  Bile ducts: Normal caliber.  Gallbladder: Within normal limits.  Spleen: Within normal limits.  Pancreas: Within normal limits.  Adrenal glands: Within normal limits.  Kidneys/ureters: Mild left hydroureteronephrosis and perinephric fat   stranding caused by an approximately 5 x 3 mm stone located in the distal   left ureter, immediately proximal to uterovesical junction.  A 3 mm   calcificfocus in the right lower pole (2:56) likely reflects a   nonobstructing renal stone.    Bladder: Within normal limits.  Reproductive organs: The prostate measures approximately 4.3 x 5.5 x 4.5   cm.      Bowel: Small hiatal hernia.  No bowel obstruction.  Normal appendix.    Mild sigmoid diverticulosis.  Peritoneum: No ascites.    Retroperitoneum: Lymphadenopathy.  Vasculature: Scattered atherosclerotic calcifications of the aortoiliac   tree.    Abdominal wall/soft tissues: Within normal limits.  Bones: An approximately 3.2 x 1.1 cm sclerotic focus in the right ilium   (2:91) is grossly stable from 04/12/2016.  Degenerative changes in the   spine; no clinically significant spinal canal stenosis is visualized by   CT technique.  Mild bilateral neural foraminal narrowing at L2-L3 through   L5-S1.      IMPRESSION:  Mild left hydroureteronephrosis and perinephric fat stranding caused by   an approximately 5 x 3 mm stone located in the distal left ureter,   immediately proximal to the uterovesical junction.  Superimposed   genitourinary infection should be excluded based on clinical symptoms and   laboratory values.                    KAREN LOPEZ M.D.,ATTENDING RADIOLOGIST  This document has been electronically signed. Sep 24 2018  8:41PM                < end of copied text >    OTHER: 	  	  LABS:	 	    CARDIAC MARKERS:                                  11.3   9.1   )-----------( 232      ( 25 Sep 2018 06:00 )             34.4     09-25    139  |  106  |  17  ----------------------------<  141<H>  4.2   |  24  |  1.02    Ca    9.1      25 Sep 2018 06:00    TPro  7.8  /  Alb  4.4  /  TBili  0.3  /  DBili  x   /  AST  17  /  ALT  18  /  AlkPhos  101  09-24    proBNP:   Lipid Profile:   HgA1c:   TSH:

## 2018-09-25 NOTE — H&P ADULT - HISTORY OF PRESENT ILLNESS
The patient is a 68y Male complaining of flank pain.	   68M HTN, HLD, DM h/o renal stones requiring a stent and lithotripsy p/w flank pain for 1 day. Pt reports last night he began to feel and sore/sharp flank pain in his L flank with radiation to his groin. Pain waxes and wanes in intensity, currently 8/10. Took tylenol at 12pm advil at 4pm w/o relief. Reports some subjective fevers in the afternoon and also associated nausea. No CP, SOB, no dysuria or urgency.

## 2018-09-25 NOTE — CONSULT NOTE ADULT - ASSESSMENT
69 yo male admitted with renal colic secondary to 5mm left distal ureteral calculi and found to have significant coronary calcification on CT. Also complains of exertional shortness of breath

## 2018-09-25 NOTE — ED CDU PROVIDER SUBSEQUENT DAY NOTE - HISTORY
Case discussed with patient's Urologist Dr. Sultan Cooper made aware and agrees with plan. Pt resting, reports mild discomfort to left flank, declines pain medication. Will continue to monitor, IVF, over night.

## 2018-09-25 NOTE — ED CDU PROVIDER DISPOSITION NOTE - CLINICAL COURSE
68M HTN, HLD, DM h/o renal stones requiring a stent and lithotripsy p/w flank pain for 1 day. Pt reports last night he began to feel and sore/sharp flank pain in his L flank with radiation to his groin. Pain waxes and wanes in intensity, currently 8/10. Took tylenol at 12pm advil at 4pm w/o relief. Reports some subjective fevers in the afternoon and also associated nausea. No CP, SOB, no dysuria or urgency.  In ED, patient had laboratory significant for mild leukocytosis 11.5, UA with blood and Abdominal/pelvis CT showing Mild left hydroureteronephrosis and perinephric fat stranding caused by an approximately 5 x 3 mm stone located in the distal left ureter, immediately proximal to the uterovesical junction. Pt was evaluated by Urology and sent to CDU for pain control, IVF, Flomax, frequent evaluation, repeat laboratory, NPO for possible stent placement. 68M HTN, HLD, DM h/o renal stones requiring a stent and lithotripsy p/w flank pain for 1 day. Pt reports last night he began to feel and sore/sharp flank pain in his L flank with radiation to his groin. Pain waxes and wanes in intensity, currently 8/10. Took tylenol at 12pm advil at 4pm w/o relief. Reports some subjective fevers in the afternoon and also associated nausea. No CP, SOB, no dysuria or urgency.  In ED, patient had laboratory significant for mild leukocytosis 11.5, UA with blood and Abdominal/pelvis CT showing Mild left hydroureteronephrosis and perinephric fat stranding caused by an approximately 5 x 3 mm stone located in the distal left ureter, immediately proximal to the uterovesical junction. Pt was evaluated by Urology and sent to CDU for pain control, IVF, Flomax, frequent evaluation, repeat laboratory, NPO for possible stent placement. Spoke with Urology from Dr. Goldberg's group, would like to admit under Dr. Salazar for observation and possible ureteral stent placement tomorrow.

## 2018-09-25 NOTE — ED CDU PROVIDER SUBSEQUENT DAY NOTE - ATTENDING CONTRIBUTION TO CARE
Attending MD Cox:   I personally have seen and examined this patient.  Physician assistant note reviewed and agree on plan of care and except where noted.  See below for details.     68M with nephrolithiasis in CDU for pain control.  Failing pain control in CDU.  Seen by urology.  Patient reports persistent pain.  Denies chest pain, shortness of breath, palpitations. Reports mild abdominal pain on L and L flank.  Reports able to urinate.  Reports subjective fevers. Denies vomiting, diarrhea, blood in stools.  On exam, head NCAT, PERRL, FROM at neck, no tenderness to palpation or stepoffs along length of spine, lungs CTAB with good inspiratory effort, +S1S2, no m/r/g, abdomen soft with +BS, mild L sided abdominal and suprapubic tenderness, ND, L CVAT, moving all extremities with 5/5 strength bilateral upper and lower extremities, good and equal  strength bilaterally; A/P: 68M with nephrolithiasis, will need admission for continued management of pain and stone, possible procedural intervention by urology

## 2018-09-25 NOTE — ED ADULT NURSE REASSESSMENT NOTE - COMFORT CARE
darkened lights/plan of care explained
ambulated to bathroom/plan of care explained/po fluids offered

## 2018-09-25 NOTE — ED CDU PROVIDER DISPOSITION NOTE - PLAN OF CARE
STAY HYDRATED and Strain Urine. Follow up with your Primary Care Physician within the next 2-3 days as well as your Urologist Dr. Sultan Cooper. Bring a copy of your test results with you to your appointment  Continue your current medication regim en. Return to the Emergency Room if you experience new or worsening symptoms .Oxycodone 5mg every 6 hours as needed for pain. Do not drive or consume alcohol while taking. Take Flomax once daily. Take Motrin 400-600mg every 6 hrs as needed for pain. Take with food

## 2018-09-25 NOTE — CONSULT NOTE ADULT - PROBLEM SELECTOR RECOMMENDATION 2
Given symptomatology, CT findings and risk factors, will need SPECTand TTEC for further workup. This may be obtained as outpatient

## 2018-09-25 NOTE — H&P ADULT - NSHPLABSRESULTS_GEN_ALL_CORE
11.3   9.1   )-----------( 232      ( 25 Sep 2018 06:00 )             34.4           139  |  106  |  17  ----------------------------<  141<H>  4.2   |  24  |  1.02    Ca    9.1      25 Sep 2018 06:00    TPro  7.8  /  Alb  4.4  /  TBili  0.3  /  DBili  x   /  AST  17  /  ALT  18  /  AlkPhos  101                Urinalysis Basic - ( 24 Sep 2018 20:45 )    Color: Light Yellow / Appearance: Clear / S.016 / pH: x  Gluc: x / Ketone: Negative  / Bili: Negative / Urobili: Negative   Blood: x / Protein: 30 mg/dL / Nitrite: Negative   Leuk Esterase: Negative / RBC: 31 /hpf / WBC 2 /hpf   Sq Epi: x / Non Sq Epi: 0 /hpf / Bacteria: Negative            Lactate Trend            CAPILLARY BLOOD GLUCOSE      POCT Blood Glucose.: 153 mg/dL (25 Sep 2018 08:53)      < from: CT Abdomen and Pelvis No Cont (18 @ 20:18) >    IMPRESSION:  Mild left hydroureteronephrosis and perinephric fat stranding caused by   an approximately 5 x 3 mm stone located in the distal left ureter,   immediately proximal to the uterovesical junction.  Superimposed   genitourinary infection should be excluded based on clinical symptoms and   laboratory values.    < end of copied text >

## 2018-09-25 NOTE — H&P ADULT - NSHPPHYSICALEXAM_GEN_ALL_CORE
PHYSICAL EXAMINATION:  Vital Signs Last 24 Hrs  T(C): 36.9 (25 Sep 2018 08:01), Max: 37.1 (24 Sep 2018 18:53)  T(F): 98.5 (25 Sep 2018 08:01), Max: 98.7 (24 Sep 2018 18:53)  HR: 74 (25 Sep 2018 08:01) (63 - 98)  BP: 146/76 (25 Sep 2018 08:01) (135/74 - 189/91)  BP(mean): --  RR: 18 (25 Sep 2018 08:01) (18 - 18)  SpO2: 95% (25 Sep 2018 08:01) (95% - 98%)  CAPILLARY BLOOD GLUCOSE      POCT Blood Glucose.: 153 mg/dL (25 Sep 2018 08:53)  POCT Blood Glucose.: 146 mg/dL (25 Sep 2018 01:32)      GENERAL: NAD, well-groomed, well-developed  HEAD:  atraumatic, normocephalic  EYES: sclera anicteric  ENMT: mucous membranes moist  NECK: supple, No JVD  CHEST/LUNG: clear to auscultation bilaterally; no rales, rhonchi, or wheezing b/l  HEART: normal S1, S2  ABDOMEN: BS+, soft, ND, L flank tender  EXTREMITIES:  pulses palpable; no clubbing, cyanosis, or edema b/l LEs  NEURO: awake, alert, interactive; moves all extremities  SKIN: no rashes or lesions

## 2018-09-25 NOTE — CONSULT NOTE ADULT - SUBJECTIVE AND OBJECTIVE BOX
Urology Consult Note    Chief Complaint:  Left flank pain  Left ureteral stone    History of Present Illness:  68yM with history of urolithiasis who presents with 1 day of left abdominal radiating to left groin. Pain is severe. No fevers or chills. Subsequently went to ED. CT a/p demonstrated an obstructing left 5mm UVJ stone.     PAST MEDICAL & SURGICAL HISTORY:  Renal colic  Hypertension  Hypercholesteremia  Diabetes      FAMILY HISTORY:      Allergies    No Known Allergies    Intolerances        Social History:  Denies tobacco or alcohol use    Review of Systems:   Constitutional: No weight loss, no weakness  HEENT: No visual loss, no hearing loss, no sneezing  Skin: No rash or itching  CV: No chest pain, no chest pressure  Pulm: No shortness of breath, cough, or sputum  GI: No melena, no constipation  : Per HPI  Neuro: No headache, dizziness  MSK: No muscle pain, no joint pain  Heme: No anemia, bruising, or bleeding  Lymphatics: No enlarged nodes, no history of splenectomy  Psych: No depression of anxiety  Endo: No cold or heat intolerance  Allergies: No asthma, hives    Physical Exam:  Vital signs  T(C): 36.9 (18 @ 08:01), Max: 37.1 (18 @ 18:53)  HR: 74 (18 @ 08:01)  BP: 146/76 (18 @ 08:01)  SpO2: 95% (18 @ 08:01)  Wt(kg): --    Gen: No acute distress. Normal mood  HEENT: Normocephalic, neck supple  CV: Hemodynamically stable  Pulm: No increased work of breathing  Abd: soft, non-tender, non-distended  Back: No CVA tenderness, no midline pain  Extremities: No significant deformity or joint abnormality  Neuro: Alert & oriented x3, No focal deficits  Skin: Skin normal color, normal texture  Psych: Normal affect, normal behavior  : Non-palpable bladder      Labs:       @ 06:00    WBC 9.1   / Hct 34.4  / SCr 1.02      @ 20:45    WBC 11.5  / Hct 39.2  / SCr 0.91         139  |  106  |  17  ----------------------------<  141<H>  4.2   |  24  |  1.02    Ca    9.1      25 Sep 2018 06:00    TPro  7.8  /  Alb  4.4  /  TBili  0.3  /  DBili  x   /  AST  17  /  ALT  18  /  AlkPhos  101  -      Urinalysis Basic - ( 24 Sep 2018 20:45 )    Color: Light Yellow / Appearance: Clear / S.016 / pH: x  Gluc: x / Ketone: Negative  / Bili: Negative / Urobili: Negative   Blood: x / Protein: 30 mg/dL / Nitrite: Negative   Leuk Esterase: Negative / RBC: 31 /hpf / WBC 2 /hpf   Sq Epi: x / Non Sq Epi: 0 /hpf / Bacteria: Negative    CT a/p:  IMPRESSION:  Mild left hydroureteronephrosis and perinephric fat stranding caused by   an approximately 5 x 3 mm stone located in the distal left ureter,   immediately proximal to the uterovesical junction.  Superimposed   genitourinary infection should be excluded based on clinical symptoms and   laboratory values.

## 2018-09-25 NOTE — H&P ADULT - NSHPSOCIALHISTORY_GEN_ALL_CORE
Social History:    Marital Status:  ( x  )    (   ) Single    (   )    (  )   Occupation:   Lives with: (  ) alone  (  ) children   ( x ) spouse   (  ) parents  (  ) other    Substance Use (street drugs): ( x ) never used  (  ) other:  Tobacco Usage:  ( x  ) never smoked   (   ) former smoker   (   ) current smoker  (     ) pack years  (        ) last cigarette date  Alcohol Usage: denies    (     ) Advanced Directives: (     ) None    (      ) DNR    (     ) DNI    (     ) Health Care Proxy:

## 2018-09-25 NOTE — ED ADULT NURSE REASSESSMENT NOTE - NS ED NURSE REASSESS COMMENT FT1
13.00  pt is C/O  pain medicated as per order   14.30 pt is feeling better with pain  Pt is admitted has bed  report given to floor  Pt is stable to go to floor
Pt states that he has no relief from pain.  Confirmed with MD Collins to give pt 4 more mg of morphine.  VSS.  Pt hypertensive.  Safety and comfort maintained. will continue to monitor. +
Pt received from MILEY Brizuela . Pt oriented to CDU & plan of care was discussed. Pt in CDU for possible stent for stone, fluids, and pain control. Pt denies flank pain as of now. Pt states, " I feel like im going to the bathroom more then usual. Explained to pt to notify RN or PA if flank pain comes back.  Safety & comfort measures maintained. Call bell in reach. Will continue to monitor.
Pt states that pain is better, pt does not appear to be in visible discomfort anymore.  Pt states that current pain rating of 3/10 is tolerable.  Safety and comfort maintained. Will continue to monitor.
07.00 Am  Received Report from MILEY Evans .   08.00Am Pt reassessed   Pt denies pain N/V/D fever chills . Has no fever chills CP SOB .Comfort care & safety measures continued IV site looks clean & dry. Pt is awaiting for CDU eval continue to monitor   08.30 Am pt was S/B urology team pt is for admission Pt is allowed for oral fluids now  NPO from tonight  Awaiting for admission

## 2018-09-26 ENCOUNTER — TRANSCRIPTION ENCOUNTER (OUTPATIENT)
Age: 68
End: 2018-09-26

## 2018-09-26 LAB
ANION GAP SERPL CALC-SCNC: 9 MMOL/L — SIGNIFICANT CHANGE UP (ref 5–17)
BUN SERPL-MCNC: 15 MG/DL — SIGNIFICANT CHANGE UP (ref 7–23)
CALCIUM SERPL-MCNC: 8.9 MG/DL — SIGNIFICANT CHANGE UP (ref 8.4–10.5)
CHLORIDE SERPL-SCNC: 111 MMOL/L — HIGH (ref 96–108)
CO2 SERPL-SCNC: 22 MMOL/L — SIGNIFICANT CHANGE UP (ref 22–31)
CREAT SERPL-MCNC: 0.89 MG/DL — SIGNIFICANT CHANGE UP (ref 0.5–1.3)
CULTURE RESULTS: SIGNIFICANT CHANGE UP
GLUCOSE BLDC GLUCOMTR-MCNC: 109 MG/DL — HIGH (ref 70–99)
GLUCOSE BLDC GLUCOMTR-MCNC: 118 MG/DL — HIGH (ref 70–99)
GLUCOSE BLDC GLUCOMTR-MCNC: 130 MG/DL — HIGH (ref 70–99)
GLUCOSE BLDC GLUCOMTR-MCNC: 168 MG/DL — HIGH (ref 70–99)
GLUCOSE SERPL-MCNC: 104 MG/DL — HIGH (ref 70–99)
HBA1C BLD-MCNC: 6.6 % — HIGH (ref 4–5.6)
HCT VFR BLD CALC: 32 % — LOW (ref 39–50)
HGB BLD-MCNC: 10.2 G/DL — LOW (ref 13–17)
MCHC RBC-ENTMCNC: 29.9 PG — SIGNIFICANT CHANGE UP (ref 27–34)
MCHC RBC-ENTMCNC: 31.9 GM/DL — LOW (ref 32–36)
MCV RBC AUTO: 93.8 FL — SIGNIFICANT CHANGE UP (ref 80–100)
PLATELET # BLD AUTO: 257 K/UL — SIGNIFICANT CHANGE UP (ref 150–400)
POTASSIUM SERPL-MCNC: 4 MMOL/L — SIGNIFICANT CHANGE UP (ref 3.5–5.3)
POTASSIUM SERPL-SCNC: 4 MMOL/L — SIGNIFICANT CHANGE UP (ref 3.5–5.3)
RBC # BLD: 3.41 M/UL — LOW (ref 4.2–5.8)
RBC # FLD: 13.6 % — SIGNIFICANT CHANGE UP (ref 10.3–14.5)
SODIUM SERPL-SCNC: 142 MMOL/L — SIGNIFICANT CHANGE UP (ref 135–145)
SPECIMEN SOURCE: SIGNIFICANT CHANGE UP
WBC # BLD: 8.48 K/UL — SIGNIFICANT CHANGE UP (ref 3.8–10.5)
WBC # FLD AUTO: 8.48 K/UL — SIGNIFICANT CHANGE UP (ref 3.8–10.5)

## 2018-09-26 PROCEDURE — 76775 US EXAM ABDO BACK WALL LIM: CPT | Mod: 26

## 2018-09-26 PROCEDURE — 74018 RADEX ABDOMEN 1 VIEW: CPT | Mod: 26

## 2018-09-26 RX ORDER — ASPIRIN/CALCIUM CARB/MAGNESIUM 324 MG
1 TABLET ORAL
Qty: 0 | Refills: 0 | COMMUNITY

## 2018-09-26 RX ORDER — TAMSULOSIN HYDROCHLORIDE 0.4 MG/1
1 CAPSULE ORAL
Qty: 30 | Refills: 0 | OUTPATIENT
Start: 2018-09-26 | End: 2018-10-25

## 2018-09-26 RX ORDER — METOPROLOL TARTRATE 50 MG
1 TABLET ORAL
Qty: 0 | Refills: 0 | COMMUNITY

## 2018-09-26 RX ORDER — ATORVASTATIN CALCIUM 80 MG/1
1 TABLET, FILM COATED ORAL
Qty: 0 | Refills: 0 | COMMUNITY

## 2018-09-26 RX ORDER — GLIMEPIRIDE 1 MG
1 TABLET ORAL
Qty: 0 | Refills: 0 | COMMUNITY

## 2018-09-26 RX ORDER — AMLODIPINE BESYLATE AND BENAZEPRIL HYDROCHLORIDE 10; 20 MG/1; MG/1
1 CAPSULE ORAL
Qty: 0 | Refills: 0 | COMMUNITY

## 2018-09-26 RX ORDER — METFORMIN HYDROCHLORIDE 850 MG/1
1 TABLET ORAL
Qty: 0 | Refills: 0 | COMMUNITY

## 2018-09-26 RX ADMIN — SODIUM CHLORIDE 3 MILLILITER(S): 9 INJECTION INTRAMUSCULAR; INTRAVENOUS; SUBCUTANEOUS at 05:52

## 2018-09-26 RX ADMIN — ATORVASTATIN CALCIUM 80 MILLIGRAM(S): 80 TABLET, FILM COATED ORAL at 21:15

## 2018-09-26 RX ADMIN — LISINOPRIL 40 MILLIGRAM(S): 2.5 TABLET ORAL at 06:19

## 2018-09-26 RX ADMIN — SODIUM CHLORIDE 3 MILLILITER(S): 9 INJECTION INTRAMUSCULAR; INTRAVENOUS; SUBCUTANEOUS at 14:18

## 2018-09-26 RX ADMIN — TAMSULOSIN HYDROCHLORIDE 0.4 MILLIGRAM(S): 0.4 CAPSULE ORAL at 21:15

## 2018-09-26 RX ADMIN — Medication 25 MILLIGRAM(S): at 05:54

## 2018-09-26 RX ADMIN — Medication 81 MILLIGRAM(S): at 12:38

## 2018-09-26 RX ADMIN — AMLODIPINE BESYLATE 10 MILLIGRAM(S): 2.5 TABLET ORAL at 05:54

## 2018-09-26 RX ADMIN — SODIUM CHLORIDE 3 MILLILITER(S): 9 INJECTION INTRAMUSCULAR; INTRAVENOUS; SUBCUTANEOUS at 22:00

## 2018-09-26 NOTE — DISCHARGE NOTE ADULT - CARE PLAN
Principal Discharge DX:	Renal colic  Goal:	resolution  Assessment and plan of treatment:	Take prescribed medication; f/u with urology; strain urine  Secondary Diagnosis:	CAD (coronary artery disease)  Assessment and plan of treatment:	take prescribed medication; f/u with PCP  Secondary Diagnosis:	Hypertension  Assessment and plan of treatment:	take prescribed medication; f/u with PCP  Secondary Diagnosis:	Hypercholesteremia  Assessment and plan of treatment:	take prescribed medication; f/u with PCP Principal Discharge DX:	Renal colic  Goal:	resolution  Assessment and plan of treatment:	Take prescribed medication; f/u with urology; strain urine  Secondary Diagnosis:	CAD (coronary artery disease)  Assessment and plan of treatment:	Coronary artery disease is a condition where the arteries the supply the heart muscle get clogges with fatty deposits & puts you at risk for a heart attack  Call your doctor if you have any new pain, pressure, or discomfort in the center of your chest, pain, tingling or discomfort in arms, back, neck, jaw, or stomach, shortness of breath, nausea, vomiting, burping or heartburn, sweating, cold and clammy skin, racing or abnormal heartbeat for more than 10 minutes or if they keep coming & going.  Call 911 and do not tr to get to hospital by care  You can help yourself with lefestyle changes (quitting smoking if you smoke), eat lots of fruits & vegetables & low fat dairy products, not a lot of meat & fatty foods, walk or some form of physical activity most days of the week, lose weight if you are overweight  Take your cardiac medication as prescribed to lower cholesterol, to lower blood pressure, aspirin to prevent blood clots, and diabetes control  Make sure to keep appointments with doctor for cardiac follow up care  Secondary Diagnosis:	Hypertension  Assessment and plan of treatment:	Follow up with your medical doctor to establish long term blood pressure treatment goals.  Low salt diet  Activity as tolerated.  Take all medication as prescribed.  Follow up with your medical doctor for routine blood pressure monitoring at your next visit.  Notify your doctor if you have any of the following symptoms:   Dizziness, Lightheadedness, Blurry vision, Headache, Chest pain, Shortness of breath  Secondary Diagnosis:	Hypercholesteremia  Assessment and plan of treatment:	Follow up with PCP for treatment goals, continue medication, have liver function testing every 3 months as anti lipid medications can cause liver irritation, eat low fat, low cholesterol meals  Secondary Diagnosis:	Pneumonia  Assessment and plan of treatment:	Received 4 days of IV antibiotic, will continue with oral antibiotics for a TOTAL of 8 day treatment course   outpatient formal sleep study after discharge recommended by pulmonologist for possible undiagnosed sleep apnea.  Pneumonia is a lung infection that can cause a fever, cough, and trouble breathing.  Continue all antibiotics as ordered until complete.  Nutrition is important, eat small frequent meals.  Get lots of rest and drink fluids.  Call your health care provider upon arrival home from hospital and make a follow up appointment for one week.  If your cough worsens, you develop fever greater than 101', you have shaking chills, a fast heartbeat, trouble breathing and/or feel your are breathing much faster than usual, call your healthcare provider.  Make sure you wash your hands frequently.  Secondary Diagnosis:	Rhinovirus  Assessment and plan of treatment:	Symptom management

## 2018-09-26 NOTE — PROGRESS NOTE ADULT - SUBJECTIVE AND OBJECTIVE BOX
Subjective: Patient seen and examined. No new events except as noted.   No pain   Feels comfortable   no CP or SOB     REVIEW OF SYSTEMS:    CONSTITUTIONAL: + weakness, fevers or chills  EYES/ENT: No visual changes;  No vertigo or throat pain   NECK: No pain or stiffness  RESPIRATORY: No cough, wheezing, hemoptysis; No shortness of breath  CARDIOVASCULAR: No chest pain or palpitations  GASTROINTESTINAL: No abdominal or epigastric pain. No nausea, vomiting, or hematemesis; No diarrhea or constipation. No melena or hematochezia.  GENITOURINARY: No dysuria, frequency or hematuria  NEUROLOGICAL: No numbness or weakness  SKIN: No itching, burning, rashes, or lesions   All other review of systems is negative unless indicated above.    MEDICATIONS:  MEDICATIONS  (STANDING):  amLODIPine   Tablet 10 milliGRAM(s) Oral daily  aspirin  chewable 81 milliGRAM(s) Oral daily  atorvastatin 80 milliGRAM(s) Oral at bedtime  dextrose 5%. 1000 milliLiter(s) (50 mL/Hr) IV Continuous <Continuous>  dextrose 50% Injectable 12.5 Gram(s) IV Push once  dextrose 50% Injectable 25 Gram(s) IV Push once  dextrose 50% Injectable 25 Gram(s) IV Push once  influenza   Vaccine 0.5 milliLiter(s) IntraMuscular once  insulin lispro (HumaLOG) corrective regimen sliding scale   SubCutaneous three times a day before meals  insulin lispro (HumaLOG) corrective regimen sliding scale   SubCutaneous at bedtime  lisinopril 40 milliGRAM(s) Oral daily  metoprolol succinate ER 25 milliGRAM(s) Oral daily  sodium chloride 0.9% lock flush 3 milliLiter(s) IV Push every 8 hours  sodium chloride 0.9%. 1000 milliLiter(s) (200 mL/Hr) IV Continuous <Continuous>  tamsulosin 0.4 milliGRAM(s) Oral at bedtime      PHYSICAL EXAM:  T(C): 36.9 (09-26-18 @ 09:57), Max: 36.9 (09-25-18 @ 12:21)  HR: 79 (09-26-18 @ 09:57) (69 - 80)  BP: 147/71 (09-26-18 @ 09:57) (117/63 - 173/76)  RR: 18 (09-26-18 @ 09:57) (18 - 18)  SpO2: 95% (09-26-18 @ 09:57) (94% - 97%)  Wt(kg): --  I&O's Summary    25 Sep 2018 07:01  -  26 Sep 2018 07:00  --------------------------------------------------------  IN: 2560 mL / OUT: 1700 mL / NET: 860 mL    26 Sep 2018 07:01  -  26 Sep 2018 10:41  --------------------------------------------------------  IN: 640 mL / OUT: 750 mL / NET: -110 mL          Appearance: Normal	  HEENT:   Normal oral mucosa, PERRL, EOMI	  Lymphatic: No lymphadenopathy , no edema  Cardiovascular: Normal S1 S2, No JVD, No murmurs , Peripheral pulses palpable 2+ bilaterally  Respiratory: Lungs clear to auscultation, normal effort 	  Gastrointestinal:  Soft, Non-tender, + BS	  Skin: No rashes, No ecchymoses, No cyanosis, warm to touch  Musculoskeletal: Normal range of motion, normal strength  Psychiatry:  Mood & affect appropriate  Ext: No edema      LABS:    CARDIAC MARKERS:                                10.2   8.48  )-----------( 257      ( 26 Sep 2018 08:00 )             32.0     09-26    142  |  111<H>  |  15  ----------------------------<  104<H>  4.0   |  22  |  0.89    Ca    8.9      26 Sep 2018 06:39    TPro  7.8  /  Alb  4.4  /  TBili  0.3  /  DBili  x   /  AST  17  /  ALT  18  /  AlkPhos  101  09-24    proBNP:   Lipid Profile:   HgA1c:   TSH:     0          TELEMETRY: 	    ECG:  	  RADIOLOGY:   DIAGNOSTIC TESTING:  [ ] Echocardiogram:  [ ]  Catheterization:  [ ] Stress Test:    OTHER:

## 2018-09-26 NOTE — PROGRESS NOTE ADULT - SUBJECTIVE AND OBJECTIVE BOX
Urology Progress Note    Overnight Events:  Pt feels better overnight. Last required pain yesterday. Sitting in chair. Tolerating PO. Has no pain this morning.      Review of Systems:   Constitutional: No weight loss, no weakness  CV: No chest pain, no chest pressure  Pulm: No shortness of breath, cough, or sputum    Physical Exam:  Vital signs  T(C): 36.6 (18 @ 06:00), Max: 36.9 (18 @ 12:21)  HR: 73 (18 @ 06:00)  BP: 156/76 (18 @ 06:00)  SpO2: 96% (18 @ 06:00)  Wt(kg): --    Gen: No acute distress. Normal mood  Abd: soft, non-tender, non-distended  : Nonpalpable bladder    Labs:       @ 06:39    WBC --    / Hct --    / SCr 0.89      @ 06:00    WBC 9.1   / Hct 34.4  / SCr 1.02         142  |  111<H>  |  15  ----------------------------<  104<H>  4.0   |  22  |  0.89    Ca    8.9      26 Sep 2018 06:39    TPro  7.8  /  Alb  4.4  /  TBili  0.3  /  DBili  x   /  AST  17  /  ALT  18  /  AlkPhos  101        Urinalysis Basic - ( 24 Sep 2018 20:45 )    Color: Light Yellow / Appearance: Clear / S.016 / pH: x  Gluc: x / Ketone: Negative  / Bili: Negative / Urobili: Negative   Blood: x / Protein: 30 mg/dL / Nitrite: Negative   Leuk Esterase: Negative / RBC: 31 /hpf / WBC 2 /hpf   Sq Epi: x / Non Sq Epi: 0 /hpf / Bacteria: Negative

## 2018-09-26 NOTE — DISCHARGE NOTE ADULT - MEDICATION SUMMARY - MEDICATIONS TO TAKE
I will START or STAY ON the medications listed below when I get home from the hospital:    aspirin 81 mg oral tablet  -- 1 tab(s) by mouth once a day  -- Indication: For antiplatelet    tamsulosin 0.4 mg oral capsule  -- 1 cap(s) by mouth once a day (at bedtime)  -- Indication: For     glimepiride 4 mg oral tablet  -- 1 tab(s) by mouth once a day  -- Indication: For Diabetes    metFORMIN 1000 mg oral tablet  -- 1 tab(s) by mouth 2 times a day  -- Indication: For Diabetes    atorvastatin 80 mg oral tablet  -- 1 tab(s) by mouth once a day  -- Indication: For Cholesterol    amlodipine-benazepril 10 mg-40 mg oral capsule  -- 1 cap(s) by mouth once a day  -- Indication: For Htn    metoprolol succinate 25 mg oral tablet, extended release  -- 1 tab(s) by mouth once a day  -- Indication: For Htn I will START or STAY ON the medications listed below when I get home from the hospital:    aspirin 81 mg oral tablet  -- 1 tab(s) by mouth once a day  -- Indication: For antiplatelet    tamsulosin 0.4 mg oral capsule  -- 1 cap(s) by mouth once a day (at bedtime)  -- Indication: For     glimepiride 4 mg oral tablet  -- 1 tab(s) by mouth once a day  -- Indication: For Diabetes    metFORMIN 1000 mg oral tablet  -- 1 tab(s) by mouth 2 times a day  -- Indication: For Diabetes    atorvastatin 80 mg oral tablet  -- 1 tab(s) by mouth once a day  -- Indication: For Cholesterol    amlodipine-benazepril 10 mg-40 mg oral capsule  -- 1 cap(s) by mouth once a day  -- Indication: For Htn    metoprolol succinate 25 mg oral tablet, extended release  -- 1 tab(s) by mouth once a day  -- Indication: For Htn    cefuroxime 500 mg oral tablet  -- 1 tab(s) by mouth every 12 hours   -- Finish all this medication unless otherwise directed by prescriber.  Medication should be taken with plenty of water.  Take with food or milk.    -- Indication: For Pneumonia

## 2018-09-26 NOTE — DISCHARGE NOTE ADULT - PATIENT PORTAL LINK FT
You can access the [facility], offered by [facility], by registering with the following website: [email or web address]

## 2018-09-26 NOTE — PROGRESS NOTE ADULT - SUBJECTIVE AND OBJECTIVE BOX
Patient is a 68y old  Male who presents with a chief complaint of flank pain (26 Sep 2018 12:11)      SUBJECTIVE / OVERNIGHT EVENTS: Comfortable without new complaints.   Review of Systems  chest pain no  palpitations no  sob no  nausea no  headache no    MEDICATIONS  (STANDING):  amLODIPine   Tablet 10 milliGRAM(s) Oral daily  aspirin  chewable 81 milliGRAM(s) Oral daily  atorvastatin 80 milliGRAM(s) Oral at bedtime  dextrose 5%. 1000 milliLiter(s) (50 mL/Hr) IV Continuous <Continuous>  dextrose 50% Injectable 12.5 Gram(s) IV Push once  dextrose 50% Injectable 25 Gram(s) IV Push once  dextrose 50% Injectable 25 Gram(s) IV Push once  influenza   Vaccine 0.5 milliLiter(s) IntraMuscular once  insulin lispro (HumaLOG) corrective regimen sliding scale   SubCutaneous three times a day before meals  insulin lispro (HumaLOG) corrective regimen sliding scale   SubCutaneous at bedtime  lisinopril 40 milliGRAM(s) Oral daily  metoprolol succinate ER 25 milliGRAM(s) Oral daily  sodium chloride 0.9% lock flush 3 milliLiter(s) IV Push every 8 hours  sodium chloride 0.9%. 1000 milliLiter(s) (200 mL/Hr) IV Continuous <Continuous>  tamsulosin 0.4 milliGRAM(s) Oral at bedtime    MEDICATIONS  (PRN):  acetaminophen   Tablet .. 650 milliGRAM(s) Oral every 6 hours PRN Temp greater or equal to 38.5C (101.3F), Mild Pain (1 - 3)  dextrose 40% Gel 15 Gram(s) Oral once PRN Blood Glucose LESS THAN 70 milliGRAM(s)/deciliter  glucagon  Injectable 1 milliGRAM(s) IntraMuscular once PRN Glucose LESS THAN 70 milligrams/deciliter  morphine  - Injectable 2 milliGRAM(s) IV Push every 4 hours PRN Severe Pain (7 - 10)  oxyCODONE    5 mG/acetaminophen 325 mG 1 Tablet(s) Oral every 4 hours PRN Moderate Pain (4 - 6)      Vital Signs Last 24 Hrs  T(C): 36.8 (26 Sep 2018 20:58), Max: 36.9 (26 Sep 2018 09:57)  T(F): 98.2 (26 Sep 2018 20:58), Max: 98.4 (26 Sep 2018 09:57)  HR: 74 (26 Sep 2018 20:58) (69 - 79)  BP: 150/79 (26 Sep 2018 20:58) (147/71 - 156/76)  BP(mean): --  RR: 18 (26 Sep 2018 20:58) (18 - 18)  SpO2: 98% (26 Sep 2018 20:58) (95% - 98%)    PHYSICAL EXAM:  GENERAL: NAD, well-developed  HEAD:  Atraumatic, Normocephalic  EYES: EOMI, PERRLA, conjunctiva and sclera clear  NECK: Supple, No JVD  CHEST/LUNG: Clear to auscultation bilaterally; No wheeze  HEART: Regular rate and rhythm; No murmurs, rubs, or gallops  ABDOMEN: Soft, Nontender, Nondistended; Bowel sounds present  EXTREMITIES:  2+ Peripheral Pulses, No clubbing, cyanosis, or edema  PSYCH: AAOx3  NEUROLOGY: non-focal  SKIN: No rashes or lesions    LABS:                        10.2   8.48  )-----------( 257      ( 26 Sep 2018 08:00 )             32.0     09-26    142  |  111<H>  |  15  ----------------------------<  104<H>  4.0   |  22  |  0.89    Ca    8.9      26 Sep 2018 06:39                Culture - Urine (collected 25 Sep 2018 03:23)  Source: .Urine Clean Catch (Midstream)  Final Report (26 Sep 2018 06:27):    <10,000 CFU/ml    Normal Urogenital andrés present        RADIOLOGY & ADDITIONAL TESTS:    Imaging Personally Reviewed:    Consultant(s) Notes Reviewed:      Care Discussed with Consultants/Other Providers:

## 2018-09-26 NOTE — DISCHARGE NOTE ADULT - ADDITIONAL INSTRUCTIONS
Follow up with PCP, Dr. Villalobos in 1 week of discharge   Follow up with cardiologist, Dr. Grewal in 1-2 weeks of discharge   Follow up with Urology, Dr. Goldberg in 1-2 weeks of discharge   Call to make appointments and bring discharge paper-work to follow up visits

## 2018-09-26 NOTE — DISCHARGE NOTE ADULT - PROVIDER TOKENS
TOKEN:'[id number]:'[id number]' TOKEN:'[id number]:'[id number]:'[id number]'

## 2018-09-26 NOTE — DISCHARGE NOTE ADULT - PLAN OF CARE
resolution Take prescribed medication; f/u with urology; strain urine take prescribed medication; f/u with PCP Coronary artery disease is a condition where the arteries the supply the heart muscle get clogges with fatty deposits & puts you at risk for a heart attack  Call your doctor if you have any new pain, pressure, or discomfort in the center of your chest, pain, tingling or discomfort in arms, back, neck, jaw, or stomach, shortness of breath, nausea, vomiting, burping or heartburn, sweating, cold and clammy skin, racing or abnormal heartbeat for more than 10 minutes or if they keep coming & going.  Call 911 and do not tr to get to hospital by care  You can help yourself with lefestyle changes (quitting smoking if you smoke), eat lots of fruits & vegetables & low fat dairy products, not a lot of meat & fatty foods, walk or some form of physical activity most days of the week, lose weight if you are overweight  Take your cardiac medication as prescribed to lower cholesterol, to lower blood pressure, aspirin to prevent blood clots, and diabetes control  Make sure to keep appointments with doctor for cardiac follow up care Follow up with your medical doctor to establish long term blood pressure treatment goals.  Low salt diet  Activity as tolerated.  Take all medication as prescribed.  Follow up with your medical doctor for routine blood pressure monitoring at your next visit.  Notify your doctor if you have any of the following symptoms:   Dizziness, Lightheadedness, Blurry vision, Headache, Chest pain, Shortness of breath Follow up with PCP for treatment goals, continue medication, have liver function testing every 3 months as anti lipid medications can cause liver irritation, eat low fat, low cholesterol meals Received 4 days of IV antibiotic, will continue with oral antibiotics for a TOTAL of 8 day treatment course   outpatient formal sleep study after discharge recommended by pulmonologist for possible undiagnosed sleep apnea.  Pneumonia is a lung infection that can cause a fever, cough, and trouble breathing.  Continue all antibiotics as ordered until complete.  Nutrition is important, eat small frequent meals.  Get lots of rest and drink fluids.  Call your health care provider upon arrival home from hospital and make a follow up appointment for one week.  If your cough worsens, you develop fever greater than 101', you have shaking chills, a fast heartbeat, trouble breathing and/or feel your are breathing much faster than usual, call your healthcare provider.  Make sure you wash your hands frequently. Symptom management

## 2018-09-26 NOTE — DISCHARGE NOTE ADULT - HOSPITAL COURSE
·  Problem: Left ureteral stone.  Plan: -Pt reports significant improvement in his pain symptoms. Potential that pt passed stone even though he did not see stone in strainer 69 yo male with PMHx significant for HTN, HLD, DM, CAD, and kidney stones admitted with renal colic secondary to 5mm left distal ureteral calculi now resolved with conservative therapy, urology following. Found to have significant coronary calcification on CT, also complains of exertional shortness of breath> cardiology and pulmonary consulted. Plan for Stress test pending as out-patient. Isolated fever - RVP (+) for rhinovirus, supportive care, no evidence of UTI - chest CT with evidence of diffuse bronchial wall thickening and peribronchovascular ground glass nodular opacities suggestive of atypical pneumonia/bronchitis. On Cefepime, continue with Ceftin 500 bid for 8 days as per ID recommendations on discharge.  DC IVF. Fluid overload- diurese. Continue current management of HTN and HLD. Diabetes  - BS control on insulin sliding scale.     D/w plan and medications with Dr. Salazar, pt cleared for discharge home.

## 2018-09-26 NOTE — DISCHARGE NOTE ADULT - CARE PROVIDER_API CALL
Girma Villalobos), Internal Medicine  [address] [place] [address]  Phone: [phone]  Fax: [phone]    Raghav Oliver), Urology Surgery  [address] [place] [address]  Phone: [phone]  Fax: [phone] Girma Villalobos), Internal Medicine  [address] [place] [address]  Phone: [phone]  Fax: [phone]    Bautista Grewal (), Cardiology; Internal Medicine  [address] [facility]  [address]  [place] [address]  Phone: [phone]  Fax: [phone]    Goldberg, Gary D (MD), Urology  [address] [facility]  [address]  [place] [address]  Phone: [phone]  Fax: [phone]    Nils Salazar), Internal Medicine  [address] [place] [address]  Phone: [phone]  Fax: [phone]

## 2018-09-26 NOTE — PROGRESS NOTE ADULT - ASSESSMENT
68 m with  L kidney stone, Renal colic -IVF, pain control.  Urology follow.  Diabetes  - BS control  Hypercholesteremia  - continue meds  Hypertension -continue meds  Coronary calcifications on CT- r/o CAD Cardiology evaluation noted. Patient requesting ischemia work up inhospital. Echo pending Stress test pending Cardiology evaluation noted.   Nils Salazar MD pager [phone]

## 2018-09-26 NOTE — DISCHARGE NOTE ADULT - CARE PROVIDERS DIRECT ADDRESSES
,DirectAddress_Unknown,DirectAddress_Unknown ,DirectAddress_Unknown,DirectAddress_Unknown,[email or web address],DirectAddress_Unknown

## 2018-09-27 LAB
ANION GAP SERPL CALC-SCNC: 14 MMOL/L — SIGNIFICANT CHANGE UP (ref 5–17)
BASOPHILS # BLD AUTO: 0 K/UL — SIGNIFICANT CHANGE UP (ref 0–0.2)
BASOPHILS NFR BLD AUTO: 0.1 % — SIGNIFICANT CHANGE UP (ref 0–2)
BUN SERPL-MCNC: 12 MG/DL — SIGNIFICANT CHANGE UP (ref 7–23)
CALCIUM SERPL-MCNC: 9.7 MG/DL — SIGNIFICANT CHANGE UP (ref 8.4–10.5)
CHLORIDE SERPL-SCNC: 103 MMOL/L — SIGNIFICANT CHANGE UP (ref 96–108)
CO2 SERPL-SCNC: 24 MMOL/L — SIGNIFICANT CHANGE UP (ref 22–31)
CREAT SERPL-MCNC: 0.88 MG/DL — SIGNIFICANT CHANGE UP (ref 0.5–1.3)
EOSINOPHIL # BLD AUTO: 0.1 K/UL — SIGNIFICANT CHANGE UP (ref 0–0.5)
EOSINOPHIL NFR BLD AUTO: 0.6 % — SIGNIFICANT CHANGE UP (ref 0–6)
GLUCOSE BLDC GLUCOMTR-MCNC: 110 MG/DL — HIGH (ref 70–99)
GLUCOSE BLDC GLUCOMTR-MCNC: 137 MG/DL — HIGH (ref 70–99)
GLUCOSE BLDC GLUCOMTR-MCNC: 146 MG/DL — HIGH (ref 70–99)
GLUCOSE BLDC GLUCOMTR-MCNC: 154 MG/DL — HIGH (ref 70–99)
GLUCOSE SERPL-MCNC: 217 MG/DL — HIGH (ref 70–99)
HCT VFR BLD CALC: 36.3 % — LOW (ref 39–50)
HGB BLD-MCNC: 12 G/DL — LOW (ref 13–17)
LYMPHOCYTES # BLD AUTO: 1.1 K/UL — SIGNIFICANT CHANGE UP (ref 1–3.3)
LYMPHOCYTES # BLD AUTO: 11.1 % — LOW (ref 13–44)
MAGNESIUM SERPL-MCNC: 1.2 MG/DL — LOW (ref 1.6–2.6)
MCHC RBC-ENTMCNC: 30 PG — SIGNIFICANT CHANGE UP (ref 27–34)
MCHC RBC-ENTMCNC: 33 GM/DL — SIGNIFICANT CHANGE UP (ref 32–36)
MCV RBC AUTO: 90.8 FL — SIGNIFICANT CHANGE UP (ref 80–100)
MONOCYTES # BLD AUTO: 0.7 K/UL — SIGNIFICANT CHANGE UP (ref 0–0.9)
MONOCYTES NFR BLD AUTO: 6.6 % — SIGNIFICANT CHANGE UP (ref 2–14)
NEUTROPHILS # BLD AUTO: 8.2 K/UL — HIGH (ref 1.8–7.4)
NEUTROPHILS NFR BLD AUTO: 81.5 % — HIGH (ref 43–77)
PLATELET # BLD AUTO: 258 K/UL — SIGNIFICANT CHANGE UP (ref 150–400)
POTASSIUM SERPL-MCNC: 3.7 MMOL/L — SIGNIFICANT CHANGE UP (ref 3.5–5.3)
POTASSIUM SERPL-SCNC: 3.7 MMOL/L — SIGNIFICANT CHANGE UP (ref 3.5–5.3)
RAPID RVP RESULT: DETECTED
RBC # BLD: 3.99 M/UL — LOW (ref 4.2–5.8)
RBC # FLD: 12.9 % — SIGNIFICANT CHANGE UP (ref 10.3–14.5)
RV+EV RNA SPEC QL NAA+PROBE: DETECTED
SODIUM SERPL-SCNC: 141 MMOL/L — SIGNIFICANT CHANGE UP (ref 135–145)
WBC # BLD: 10.1 K/UL — SIGNIFICANT CHANGE UP (ref 3.8–10.5)
WBC # FLD AUTO: 10.1 K/UL — SIGNIFICANT CHANGE UP (ref 3.8–10.5)

## 2018-09-27 PROCEDURE — 93306 TTE W/DOPPLER COMPLETE: CPT | Mod: 26

## 2018-09-27 PROCEDURE — 71250 CT THORAX DX C-: CPT | Mod: 26

## 2018-09-27 PROCEDURE — 71045 X-RAY EXAM CHEST 1 VIEW: CPT | Mod: 26

## 2018-09-27 RX ORDER — FUROSEMIDE 40 MG
40 TABLET ORAL ONCE
Qty: 0 | Refills: 0 | Status: COMPLETED | OUTPATIENT
Start: 2018-09-27 | End: 2018-09-27

## 2018-09-27 RX ORDER — SENNA PLUS 8.6 MG/1
2 TABLET ORAL AT BEDTIME
Qty: 0 | Refills: 0 | Status: DISCONTINUED | OUTPATIENT
Start: 2018-09-27 | End: 2018-10-01

## 2018-09-27 RX ORDER — DOCUSATE SODIUM 100 MG
100 CAPSULE ORAL THREE TIMES A DAY
Qty: 0 | Refills: 0 | Status: DISCONTINUED | OUTPATIENT
Start: 2018-09-27 | End: 2018-10-01

## 2018-09-27 RX ORDER — MAGNESIUM SULFATE 500 MG/ML
2 VIAL (ML) INJECTION ONCE
Qty: 0 | Refills: 0 | Status: COMPLETED | OUTPATIENT
Start: 2018-09-27 | End: 2018-09-27

## 2018-09-27 RX ADMIN — ATORVASTATIN CALCIUM 80 MILLIGRAM(S): 80 TABLET, FILM COATED ORAL at 20:44

## 2018-09-27 RX ADMIN — Medication 2: at 17:12

## 2018-09-27 RX ADMIN — SODIUM CHLORIDE 3 MILLILITER(S): 9 INJECTION INTRAMUSCULAR; INTRAVENOUS; SUBCUTANEOUS at 12:58

## 2018-09-27 RX ADMIN — Medication 100 MILLIGRAM(S): at 23:22

## 2018-09-27 RX ADMIN — SODIUM CHLORIDE 200 MILLILITER(S): 9 INJECTION INTRAMUSCULAR; INTRAVENOUS; SUBCUTANEOUS at 06:51

## 2018-09-27 RX ADMIN — Medication 50 GRAM(S): at 20:43

## 2018-09-27 RX ADMIN — AMLODIPINE BESYLATE 10 MILLIGRAM(S): 2.5 TABLET ORAL at 06:05

## 2018-09-27 RX ADMIN — Medication 650 MILLIGRAM(S): at 20:19

## 2018-09-27 RX ADMIN — SENNA PLUS 2 TABLET(S): 8.6 TABLET ORAL at 20:45

## 2018-09-27 RX ADMIN — SODIUM CHLORIDE 3 MILLILITER(S): 9 INJECTION INTRAMUSCULAR; INTRAVENOUS; SUBCUTANEOUS at 06:04

## 2018-09-27 RX ADMIN — SODIUM CHLORIDE 3 MILLILITER(S): 9 INJECTION INTRAMUSCULAR; INTRAVENOUS; SUBCUTANEOUS at 23:22

## 2018-09-27 RX ADMIN — Medication 81 MILLIGRAM(S): at 12:09

## 2018-09-27 RX ADMIN — TAMSULOSIN HYDROCHLORIDE 0.4 MILLIGRAM(S): 0.4 CAPSULE ORAL at 20:45

## 2018-09-27 RX ADMIN — Medication 25 MILLIGRAM(S): at 06:04

## 2018-09-27 RX ADMIN — LISINOPRIL 40 MILLIGRAM(S): 2.5 TABLET ORAL at 06:04

## 2018-09-27 RX ADMIN — Medication 100 MILLIGRAM(S): at 13:02

## 2018-09-27 RX ADMIN — Medication 40 MILLIGRAM(S): at 12:53

## 2018-09-27 NOTE — PROGRESS NOTE ADULT - SUBJECTIVE AND OBJECTIVE BOX
Urology Progress Note    Overnight Events:  No acute events overnight. The patient has not had pain for 2 days. Feels much better. Less LUTS.    Review of Systems:   Constitutional: No weight loss, no weakness  CV: No chest pain, no chest pressure  Pulm: No shortness of breath, cough, or sputum    Physical Exam:  Vital signs  T(C): 37.1 (09-27-18 @ 05:30), Max: 37.1 (09-27-18 @ 05:30)  HR: 75 (09-27-18 @ 05:30)  BP: 149/65 (09-27-18 @ 05:30)  SpO2: 96% (09-27-18 @ 08:15)  Wt(kg): --    Gen: No acute distress. Normal mood  Abd: soft, non-tender, non-distended  : Non-palpable bladder    Labs:      09-26 @ 08:00    WBC 8.48  / Hct 32.0  / SCr --       09-26 @ 06:39    WBC --    / Hct --    / SCr 0.89     09-26    142  |  111<H>  |  15  ----------------------------<  104<H>  4.0   |  22  |  0.89    Ca    8.9      26 Sep 2018 06:39

## 2018-09-27 NOTE — PROGRESS NOTE ADULT - ASSESSMENT
67 yo male admitted with renal colic secondary to 5mm left distal ureteral calculi and found to have significant coronary calcification on CT. Also complains of exertional shortness of breath

## 2018-09-27 NOTE — PROGRESS NOTE ADULT - SUBJECTIVE AND OBJECTIVE BOX
Subjective: Patient seen and examined. No new events except as noted.   pain controlled     REVIEW OF SYSTEMS:    CONSTITUTIONAL: + weakness, fevers or chills  EYES/ENT: No visual changes;  No vertigo or throat pain   NECK: No pain or stiffness  RESPIRATORY: No cough, wheezing, hemoptysis; No shortness of breath  CARDIOVASCULAR: No chest pain or palpitations  GASTROINTESTINAL: No abdominal or epigastric pain. No nausea, vomiting, or hematemesis; No diarrhea or constipation. No melena or hematochezia.  GENITOURINARY: No dysuria, frequency or hematuria  NEUROLOGICAL: No numbness or weakness  SKIN: No itching, burning, rashes, or lesions   All other review of systems is negative unless indicated above.    MEDICATIONS:  MEDICATIONS  (STANDING):  amLODIPine   Tablet 10 milliGRAM(s) Oral daily  aspirin  chewable 81 milliGRAM(s) Oral daily  atorvastatin 80 milliGRAM(s) Oral at bedtime  dextrose 5%. 1000 milliLiter(s) (50 mL/Hr) IV Continuous <Continuous>  dextrose 50% Injectable 12.5 Gram(s) IV Push once  dextrose 50% Injectable 25 Gram(s) IV Push once  dextrose 50% Injectable 25 Gram(s) IV Push once  docusate sodium 100 milliGRAM(s) Oral three times a day  influenza   Vaccine 0.5 milliLiter(s) IntraMuscular once  insulin lispro (HumaLOG) corrective regimen sliding scale   SubCutaneous three times a day before meals  insulin lispro (HumaLOG) corrective regimen sliding scale   SubCutaneous at bedtime  lisinopril 40 milliGRAM(s) Oral daily  metoprolol succinate ER 25 milliGRAM(s) Oral daily  senna 2 Tablet(s) Oral at bedtime  sodium chloride 0.9% lock flush 3 milliLiter(s) IV Push every 8 hours  tamsulosin 0.4 milliGRAM(s) Oral at bedtime      PHYSICAL EXAM:  T(C): 37.1 (09-27-18 @ 05:30), Max: 37.1 (09-27-18 @ 05:30)  HR: 75 (09-27-18 @ 05:30) (69 - 78)  BP: 149/65 (09-27-18 @ 05:30) (136/56 - 152/77)  RR: 18 (09-27-18 @ 05:30) (18 - 18)  SpO2: 96% (09-27-18 @ 08:15) (91% - 98%)  Wt(kg): --  I&O's Summary    26 Sep 2018 07:01  -  27 Sep 2018 07:00  --------------------------------------------------------  IN: 5380 mL / OUT: 2675 mL / NET: 2705 mL          Appearance: Normal	  HEENT:   Normal oral mucosa, PERRL, EOMI	  Lymphatic: No lymphadenopathy , no edema  Cardiovascular: Normal S1 S2, No JVD, No murmurs , Peripheral pulses palpable 2+ bilaterally  Respiratory: Lungs clear to auscultation, normal effort 	  Gastrointestinal:  Soft, Non-tender, + BS	  Skin: No rashes, No ecchymoses, No cyanosis, warm to touch  Musculoskeletal: Normal range of motion, normal strength  Psychiatry:  Mood & affect appropriate  Ext: No edema      LABS:    CARDIAC MARKERS:                                10.2   8.48  )-----------( 257      ( 26 Sep 2018 08:00 )             32.0     09-26    142  |  111<H>  |  15  ----------------------------<  104<H>  4.0   |  22  |  0.89    Ca    8.9      26 Sep 2018 06:39      proBNP:   Lipid Profile:   HgA1c:   TSH:     0          TELEMETRY: 	    ECG:  	  RADIOLOGY:   DIAGNOSTIC TESTING:  [ ] Echocardiogram:  < from: TTE with Doppler (w/Cont) (09.27.18 @ 07:51) >    Patient name: RAY DUNCAN  YOB: 1950   Age: 68 (M)   MR#: [id number]  Study Date: 9/27/2018  Location: [address] [place], [place] [id number]: Марина Morrow JAMARI  Study quality: Technically difficult  Referring Physician: Nils Salazar MD  Blood Pressure: 149/65 mmHg  Height: 173 cm  Weight: 91 kg  BSA: 2 m2  ------------------------------------------------------------------------  PROCEDURE: Transthoracic echocardiogram with 2-D, M-Mode  and complete spectral and color flow Doppler. Verbal  consent was obtained for injection of  Ultrasonic Enhancing  Agent following a discussion of risks and benefits.  Following intravenous injection of Ultrasonic Enhancing  Agent , harmonic imaging was performed.  INDICATION: Essential (primary) hypertension (I10)  ------------------------------------------------------------------------  Dimensions:    Normal Values:  LA:     4.4    2.0 - 4.0 cm  Ao:     3.2    2.0 - 3.8 cm  SEPTUM: 1.1    0.6 - 1.2 cm  PWT:    1.0    0.6 - 1.1 cm  LVIDd: 4.5    3.0 - 5.6 cm  LVIDs:  2.6    1.8 - 4.0 cm  Derived variables:  LVMI: 80 g/m2  RWT: 0.44  Fractional short: 42 %  EF (Visual Estimate): 60-65 %  Doppler Peak Velocity (m/sec): AoV=1.3  ------------------------------------------------------------------------  Observations:  Mitral Valve: Normal mitral valve. Minimal mitral  regurgitation.  Aortic Valve/Aorta: Normal trileaflet aortic valve. Peak  transaortic valve gradient equals 7 mm Hg, mean transaortic  valve gradient equals 5 mm Hg, aortic valve velocity time  integral equals 30 cm.  Aortic Root: 3.2 cm.  Left Atrium: Normal left atrium.  Left Ventricle: Normal left ventricular systolic function.   Endocardial visualization enhanced with intravenous  injection of Ultrasonic Enhancing Agent (Definity). Normal  left ventricular internal dimensions and wall thicknesses.  Normal diastolic function  Right Heart: Normal right atrium. Normal right ventricular  size and function. Normal tricuspid valve. Minimal  tricuspid regurgitation.Normal pulmonic valve.  Pericardium/Pleura: Normal pericardium with no pericardial  effusion.  Hemodynamic: Estimated right atrial pressure is 8 mm Hg.  ------------------------------------------------------------------------  Conclusions:  1. Normalleft ventricular internal dimensions and wall  thicknesses.  2. Normal left ventricular systolic function.   Endocardial  visualization enhanced with intravenous injection of  Ultrasonic Enhancing Agent (Definity).  *** No previous Echo exam.  ------------------------------------------------------------------------  Confirmed on  9/27/2018 - 10:16:30 by Bertin Parra M.D.  ------------------------------------------------------------------------    < end of copied text >    [ ]  Catheterization:  [ ] Stress Test:    OTHER:

## 2018-09-27 NOTE — CHART NOTE - NSCHARTNOTEFT_GEN_A_CORE
Desat to 91% on RA reported by RN. pt seen at bedside, A & O X3, NAD, reports some dry cough and slight chest discomfort with inspirations at times which started last night. Denies SOB, runny nose, sore throat, sputum production, dizziness, fever, chills, HA, N & V, abdominal pain, diarrhea, dysuria.    Vital Signs Last 24 Hrs  T(C): 37.3 (27 Sep 2018 17:04), Max: 37.3 (27 Sep 2018 17:04)  T(F): 99.1 (27 Sep 2018 17:04), Max: 99.1 (27 Sep 2018 17:04)  HR: 89 (27 Sep 2018 17:04) (74 - 89)  BP: 177/75 (27 Sep 2018 12:00) (136/56 - 177/75)  BP(mean): --  RR: 18 (27 Sep 2018 17:04) (18 - 18)  SpO2: 94% (27 Sep 2018 17:04) (91% - 98%)      Labs:                          10.2   8.48  )-----------( 257      ( 26 Sep 2018 08:00 )             32.0     09-26    142  |  111<H>  |  15  ----------------------------<  104<H>  4.0   |  22  |  0.89    Ca    8.9      26 Sep 2018 06:39    Radiology:  < from: CT Abdomen and Pelvis No Cont (09.24.18 @ 20:18) >    IMPRESSION:  Mild left hydroureteronephrosis and perinephric fat stranding caused by   an approximately 5 x 3 mm stone located in the distal left ureter,   immediately proximal to the uterovesical junction.  Superimposed   genitourinary infection should be excluded based on clinical symptoms and   laboratory values.      < end of copied text >    < from: US Renal (09.26.18 @ 14:42) >    IMPRESSION:     No hydronephrosis. Resolution of the previously seen left-sided   hydronephrosis. The small nonobstructing calculus within the left kidney   is not appreciated on this examination.    Cannot entirely exclude small fine debris within the urinary bladder.   Please correlate with urinalysis.      < end of copied text >  < from: Xray Kidney Ureter Bladder (09.26.18 @ 11:48) >    MPRESSION:     No evidence of radiopaque renal calculi.        < end of copied text >    Physical Exam:  General: WN/WD NAD  Neurology: A&Ox3, nonfocal, VALERA x 4  Head:  Normocephalic, atraumatic  Respiratory: B/L basilar crackles   CV: RRR, S1S2, no murmur  Abdominal: Soft, NT, ND no palpable mass  MSK: No edema, + peripheral pulses, FROM all 4 extremity    Assessment & Plan:  68M HTN, HLD, DM h/o renal stones requiring a stent and lithotripsy p/w flank pain, found to have Mild left hydroureteronephrosis and approximately 5 x 3 mm stone located in the distal left ureter, immediately proximal to the uterovesical junction, s/p IVF NS at 200ml/hr and Flomax, repeat renal Sono without evidence of hydronephrosis or renal stones, now denies flank pain, probably pt passed the stone?.  Now seen for Desat to 91% on RA.  R/O Fluid overload- been on NS 200ml/hr   - s/p CXR, will follow up with result  - Discontinue IVF  - Lasix 40 mg IV X1 now  - Supplemental Oxygen as needed via NC  - Discussed with Dr. Martin Rosario NP-C  #[id number]    @ 17:00  CXR show New opacities in both lower lobes, which may represent atelectasis versus pneumonia. O2 sat improved to 94-95% on RA after Lasix, pt reports dry frequent cough. B/L crackles +. No fever, no elevated WBC, no evidence of infection now  -Will check RVP  - Dr. Salazar aware of CXR result, wants CT of chest   - Will check CBC with diff and BMP tomorrow     Deneen Rosario NP-C  #[id number]

## 2018-09-27 NOTE — PROGRESS NOTE ADULT - SUBJECTIVE AND OBJECTIVE BOX
Patient is a 68y old  Male who presents with a chief complaint of flank pain (27 Sep 2018 11:31)      SUBJECTIVE / OVERNIGHT EVENTS: Comfortable without new complaints. Had some SOB in am.  Review of Systems  chest pain no  palpitations no  sob yes  nausea no  headache no    MEDICATIONS  (STANDING):  amLODIPine   Tablet 10 milliGRAM(s) Oral daily  aspirin  chewable 81 milliGRAM(s) Oral daily  atorvastatin 80 milliGRAM(s) Oral at bedtime  dextrose 5%. 1000 milliLiter(s) (50 mL/Hr) IV Continuous <Continuous>  dextrose 50% Injectable 12.5 Gram(s) IV Push once  dextrose 50% Injectable 25 Gram(s) IV Push once  dextrose 50% Injectable 25 Gram(s) IV Push once  docusate sodium 100 milliGRAM(s) Oral three times a day  furosemide   Injectable 40 milliGRAM(s) IV Push once  influenza   Vaccine 0.5 milliLiter(s) IntraMuscular once  insulin lispro (HumaLOG) corrective regimen sliding scale   SubCutaneous three times a day before meals  insulin lispro (HumaLOG) corrective regimen sliding scale   SubCutaneous at bedtime  lisinopril 40 milliGRAM(s) Oral daily  metoprolol succinate ER 25 milliGRAM(s) Oral daily  senna 2 Tablet(s) Oral at bedtime  sodium chloride 0.9% lock flush 3 milliLiter(s) IV Push every 8 hours  tamsulosin 0.4 milliGRAM(s) Oral at bedtime    MEDICATIONS  (PRN):  acetaminophen   Tablet .. 650 milliGRAM(s) Oral every 6 hours PRN Temp greater or equal to 38.5C (101.3F), Mild Pain (1 - 3)  dextrose 40% Gel 15 Gram(s) Oral once PRN Blood Glucose LESS THAN 70 milliGRAM(s)/deciliter  glucagon  Injectable 1 milliGRAM(s) IntraMuscular once PRN Glucose LESS THAN 70 milligrams/deciliter  morphine  - Injectable 2 milliGRAM(s) IV Push every 4 hours PRN Severe Pain (7 - 10)  oxyCODONE    5 mG/acetaminophen 325 mG 1 Tablet(s) Oral every 4 hours PRN Moderate Pain (4 - 6)      Vital Signs Last 24 Hrs  T(C): 36.8 (27 Sep 2018 12:00), Max: 37.1 (27 Sep 2018 05:30)  T(F): 98.2 (27 Sep 2018 12:00), Max: 98.7 (27 Sep 2018 05:30)  HR: 76 (27 Sep 2018 12:00) (69 - 78)  BP: 177/75 (27 Sep 2018 12:00) (136/56 - 177/75)  BP(mean): --  RR: 18 (27 Sep 2018 12:00) (18 - 18)  SpO2: 93% (27 Sep 2018 12:00) (91% - 98%)    PHYSICAL EXAM:  GENERAL: NAD, well-developed  HEAD:  Atraumatic, Normocephalic  EYES: EOMI, PERRLA, conjunctiva and sclera clear  NECK: Supple, No JVD  CHEST/LUNG: few rales to auscultation bilaterally; No wheeze  HEART: Regular rate and rhythm; No murmurs, rubs, or gallops  ABDOMEN: Soft, Nontender, Nondistended; Bowel sounds present  EXTREMITIES:  2+ Peripheral Pulses, No clubbing, cyanosis, or edema  PSYCH: AAOx3  NEUROLOGY: non-focal  SKIN: No rashes or lesions    LABS:                        10.2   8.48  )-----------( 257      ( 26 Sep 2018 08:00 )             32.0     09-26    142  |  111<H>  |  15  ----------------------------<  104<H>  4.0   |  22  |  0.89    Ca    8.9      26 Sep 2018 06:39                Culture - Urine (collected 25 Sep 2018 03:23)  Source: .Urine Clean Catch (Midstream)  Final Report (26 Sep 2018 06:27):    <10,000 CFU/ml    Normal Urogenital andrés present    < from: TTE with Doppler (w/Cont) (09.27.18 @ 07:51) >  Conclusions:  1. Normalleft ventricular internal dimensions and wall  thicknesses.  2. Normal left ventricular systolic function.   Endocardial  visualization enhanced with intravenous injection of    < end of copied text >      RADIOLOGY & ADDITIONAL TESTS:    Imaging Personally Reviewed:    Consultant(s) Notes Reviewed:      Care Discussed with Consultants/Other Providers:

## 2018-09-27 NOTE — PROGRESS NOTE ADULT - ASSESSMENT
68 m with  L kidney stone, Renal colic -resolved.  Urology follow.  DC IVF. Fluid load- diurese today.  Diabetes  - BS control  Hypercholesteremia  - continue meds  Hypertension -continue meds  Coronary calcifications on CT- r/o CAD Cardiology evaluation noted.. Echo pending Stress test pending Cardiology follow noted.   Nils Salazar MD pager [phone]

## 2018-09-28 DIAGNOSIS — J18.9 PNEUMONIA, UNSPECIFIED ORGANISM: ICD-10-CM

## 2018-09-28 LAB
ANION GAP SERPL CALC-SCNC: 14 MMOL/L — SIGNIFICANT CHANGE UP (ref 5–17)
APPEARANCE UR: CLEAR — SIGNIFICANT CHANGE UP
BACTERIA # UR AUTO: NEGATIVE — SIGNIFICANT CHANGE UP
BASOPHILS # BLD AUTO: 0.01 K/UL — SIGNIFICANT CHANGE UP (ref 0–0.2)
BASOPHILS NFR BLD AUTO: 0.1 % — SIGNIFICANT CHANGE UP (ref 0–2)
BILIRUB UR-MCNC: NEGATIVE — SIGNIFICANT CHANGE UP
BUN SERPL-MCNC: 11 MG/DL — SIGNIFICANT CHANGE UP (ref 7–23)
CALCIUM SERPL-MCNC: 9.2 MG/DL — SIGNIFICANT CHANGE UP (ref 8.4–10.5)
CHLORIDE SERPL-SCNC: 103 MMOL/L — SIGNIFICANT CHANGE UP (ref 96–108)
CO2 SERPL-SCNC: 23 MMOL/L — SIGNIFICANT CHANGE UP (ref 22–31)
COLOR SPEC: YELLOW — SIGNIFICANT CHANGE UP
CREAT SERPL-MCNC: 0.8 MG/DL — SIGNIFICANT CHANGE UP (ref 0.5–1.3)
DIFF PNL FLD: ABNORMAL
EOSINOPHIL # BLD AUTO: 0.07 K/UL — SIGNIFICANT CHANGE UP (ref 0–0.5)
EOSINOPHIL NFR BLD AUTO: 0.9 % — SIGNIFICANT CHANGE UP (ref 0–6)
EPI CELLS # UR: 0 /HPF — SIGNIFICANT CHANGE UP
GLUCOSE BLDC GLUCOMTR-MCNC: 135 MG/DL — HIGH (ref 70–99)
GLUCOSE BLDC GLUCOMTR-MCNC: 147 MG/DL — HIGH (ref 70–99)
GLUCOSE BLDC GLUCOMTR-MCNC: 152 MG/DL — HIGH (ref 70–99)
GLUCOSE BLDC GLUCOMTR-MCNC: 154 MG/DL — HIGH (ref 70–99)
GLUCOSE SERPL-MCNC: 157 MG/DL — HIGH (ref 70–99)
GLUCOSE UR QL: NEGATIVE — SIGNIFICANT CHANGE UP
HCT VFR BLD CALC: 33.1 % — LOW (ref 39–50)
HGB BLD-MCNC: 10.4 G/DL — LOW (ref 13–17)
HYALINE CASTS # UR AUTO: 0 /LPF — SIGNIFICANT CHANGE UP (ref 0–2)
IMM GRANULOCYTES NFR BLD AUTO: 0.4 % — SIGNIFICANT CHANGE UP (ref 0–1.5)
KETONES UR-MCNC: NEGATIVE — SIGNIFICANT CHANGE UP
LEUKOCYTE ESTERASE UR-ACNC: NEGATIVE — SIGNIFICANT CHANGE UP
LYMPHOCYTES # BLD AUTO: 1.06 K/UL — SIGNIFICANT CHANGE UP (ref 1–3.3)
LYMPHOCYTES # BLD AUTO: 13.9 % — SIGNIFICANT CHANGE UP (ref 13–44)
MAGNESIUM SERPL-MCNC: 1.5 MG/DL — LOW (ref 1.6–2.6)
MCHC RBC-ENTMCNC: 28.7 PG — SIGNIFICANT CHANGE UP (ref 27–34)
MCHC RBC-ENTMCNC: 31.4 GM/DL — LOW (ref 32–36)
MCV RBC AUTO: 91.4 FL — SIGNIFICANT CHANGE UP (ref 80–100)
MONOCYTES # BLD AUTO: 0.63 K/UL — SIGNIFICANT CHANGE UP (ref 0–0.9)
MONOCYTES NFR BLD AUTO: 8.3 % — SIGNIFICANT CHANGE UP (ref 2–14)
NEUTROPHILS # BLD AUTO: 5.83 K/UL — SIGNIFICANT CHANGE UP (ref 1.8–7.4)
NEUTROPHILS NFR BLD AUTO: 76.4 % — SIGNIFICANT CHANGE UP (ref 43–77)
NITRITE UR-MCNC: NEGATIVE — SIGNIFICANT CHANGE UP
PH UR: 6.5 — SIGNIFICANT CHANGE UP (ref 5–8)
PLATELET # BLD AUTO: 260 K/UL — SIGNIFICANT CHANGE UP (ref 150–400)
POTASSIUM SERPL-MCNC: 3.3 MMOL/L — LOW (ref 3.5–5.3)
POTASSIUM SERPL-SCNC: 3.3 MMOL/L — LOW (ref 3.5–5.3)
PROT UR-MCNC: ABNORMAL
RBC # BLD: 3.62 M/UL — LOW (ref 4.2–5.8)
RBC # FLD: 13.4 % — SIGNIFICANT CHANGE UP (ref 10.3–14.5)
RBC CASTS # UR COMP ASSIST: 1 /HPF — SIGNIFICANT CHANGE UP (ref 0–4)
SODIUM SERPL-SCNC: 140 MMOL/L — SIGNIFICANT CHANGE UP (ref 135–145)
SP GR SPEC: 1.01 — SIGNIFICANT CHANGE UP (ref 1.01–1.02)
UROBILINOGEN FLD QL: NEGATIVE — SIGNIFICANT CHANGE UP
WBC # BLD: 7.63 K/UL — SIGNIFICANT CHANGE UP (ref 3.8–10.5)
WBC # FLD AUTO: 7.63 K/UL — SIGNIFICANT CHANGE UP (ref 3.8–10.5)

## 2018-09-28 PROCEDURE — 74176 CT ABD & PELVIS W/O CONTRAST: CPT | Mod: 26

## 2018-09-28 PROCEDURE — 93010 ELECTROCARDIOGRAM REPORT: CPT

## 2018-09-28 PROCEDURE — 99223 1ST HOSP IP/OBS HIGH 75: CPT

## 2018-09-28 RX ORDER — MAGNESIUM SULFATE 500 MG/ML
2 VIAL (ML) INJECTION ONCE
Qty: 0 | Refills: 0 | Status: DISCONTINUED | OUTPATIENT
Start: 2018-09-28 | End: 2018-09-28

## 2018-09-28 RX ORDER — FUROSEMIDE 40 MG
40 TABLET ORAL ONCE
Qty: 0 | Refills: 0 | Status: COMPLETED | OUTPATIENT
Start: 2018-09-28 | End: 2018-09-28

## 2018-09-28 RX ORDER — CEFTRIAXONE 500 MG/1
1 INJECTION, POWDER, FOR SOLUTION INTRAMUSCULAR; INTRAVENOUS ONCE
Qty: 0 | Refills: 0 | Status: COMPLETED | OUTPATIENT
Start: 2018-09-28 | End: 2018-09-28

## 2018-09-28 RX ORDER — CEFEPIME 1 G/1
1000 INJECTION, POWDER, FOR SOLUTION INTRAMUSCULAR; INTRAVENOUS ONCE
Qty: 0 | Refills: 0 | Status: COMPLETED | OUTPATIENT
Start: 2018-09-28 | End: 2018-09-28

## 2018-09-28 RX ORDER — POTASSIUM CHLORIDE 20 MEQ
40 PACKET (EA) ORAL ONCE
Qty: 0 | Refills: 0 | Status: COMPLETED | OUTPATIENT
Start: 2018-09-28 | End: 2018-09-28

## 2018-09-28 RX ORDER — CEFEPIME 1 G/1
1000 INJECTION, POWDER, FOR SOLUTION INTRAMUSCULAR; INTRAVENOUS EVERY 8 HOURS
Qty: 0 | Refills: 0 | Status: DISCONTINUED | OUTPATIENT
Start: 2018-09-28 | End: 2018-10-01

## 2018-09-28 RX ORDER — CEFEPIME 1 G/1
INJECTION, POWDER, FOR SOLUTION INTRAMUSCULAR; INTRAVENOUS
Qty: 0 | Refills: 0 | Status: DISCONTINUED | OUTPATIENT
Start: 2018-09-28 | End: 2018-10-01

## 2018-09-28 RX ORDER — MAGNESIUM SULFATE 500 MG/ML
1 VIAL (ML) INJECTION ONCE
Qty: 0 | Refills: 0 | Status: COMPLETED | OUTPATIENT
Start: 2018-09-28 | End: 2018-09-28

## 2018-09-28 RX ORDER — IPRATROPIUM/ALBUTEROL SULFATE 18-103MCG
3 AEROSOL WITH ADAPTER (GRAM) INHALATION EVERY 6 HOURS
Qty: 0 | Refills: 0 | Status: DISCONTINUED | OUTPATIENT
Start: 2018-09-28 | End: 2018-10-01

## 2018-09-28 RX ORDER — FUROSEMIDE 40 MG
40 TABLET ORAL ONCE
Qty: 0 | Refills: 0 | Status: COMPLETED | OUTPATIENT
Start: 2018-09-29 | End: 2018-09-29

## 2018-09-28 RX ORDER — AZITHROMYCIN 500 MG/1
500 TABLET, FILM COATED ORAL ONCE
Qty: 0 | Refills: 0 | Status: COMPLETED | OUTPATIENT
Start: 2018-09-28 | End: 2018-09-28

## 2018-09-28 RX ADMIN — Medication 40 MILLIGRAM(S): at 14:22

## 2018-09-28 RX ADMIN — Medication 100 GRAM(S): at 08:48

## 2018-09-28 RX ADMIN — AMLODIPINE BESYLATE 10 MILLIGRAM(S): 2.5 TABLET ORAL at 05:49

## 2018-09-28 RX ADMIN — AZITHROMYCIN 250 MILLIGRAM(S): 500 TABLET, FILM COATED ORAL at 05:49

## 2018-09-28 RX ADMIN — Medication 100 MILLIGRAM(S): at 05:49

## 2018-09-28 RX ADMIN — Medication 100 MILLIGRAM(S): at 13:22

## 2018-09-28 RX ADMIN — CEFEPIME 100 MILLIGRAM(S): 1 INJECTION, POWDER, FOR SOLUTION INTRAMUSCULAR; INTRAVENOUS at 13:21

## 2018-09-28 RX ADMIN — Medication 25 MILLIGRAM(S): at 05:49

## 2018-09-28 RX ADMIN — Medication 2: at 12:32

## 2018-09-28 RX ADMIN — Medication 100 MILLIGRAM(S): at 22:15

## 2018-09-28 RX ADMIN — SENNA PLUS 2 TABLET(S): 8.6 TABLET ORAL at 22:15

## 2018-09-28 RX ADMIN — CEFTRIAXONE 100 GRAM(S): 500 INJECTION, POWDER, FOR SOLUTION INTRAMUSCULAR; INTRAVENOUS at 03:18

## 2018-09-28 RX ADMIN — LISINOPRIL 40 MILLIGRAM(S): 2.5 TABLET ORAL at 05:49

## 2018-09-28 RX ADMIN — SODIUM CHLORIDE 3 MILLILITER(S): 9 INJECTION INTRAMUSCULAR; INTRAVENOUS; SUBCUTANEOUS at 05:53

## 2018-09-28 RX ADMIN — Medication 40 MILLIEQUIVALENT(S): at 08:48

## 2018-09-28 RX ADMIN — SODIUM CHLORIDE 3 MILLILITER(S): 9 INJECTION INTRAMUSCULAR; INTRAVENOUS; SUBCUTANEOUS at 22:19

## 2018-09-28 RX ADMIN — Medication 81 MILLIGRAM(S): at 12:33

## 2018-09-28 RX ADMIN — ATORVASTATIN CALCIUM 80 MILLIGRAM(S): 80 TABLET, FILM COATED ORAL at 22:14

## 2018-09-28 RX ADMIN — SODIUM CHLORIDE 3 MILLILITER(S): 9 INJECTION INTRAMUSCULAR; INTRAVENOUS; SUBCUTANEOUS at 13:22

## 2018-09-28 RX ADMIN — TAMSULOSIN HYDROCHLORIDE 0.4 MILLIGRAM(S): 0.4 CAPSULE ORAL at 22:15

## 2018-09-28 RX ADMIN — CEFEPIME 100 MILLIGRAM(S): 1 INJECTION, POWDER, FOR SOLUTION INTRAMUSCULAR; INTRAVENOUS at 22:14

## 2018-09-28 NOTE — CHART NOTE - NSCHARTNOTEFT_GEN_A_CORE
Notified by RN; pt. is febrile at 101.9F. Of note, pt. desatted to 91% earlier in the day and a CXR was performed and shows new opacities in both lower lobes, which may represent atelectasis versus pneumonia. Antibiotics were being held secondary to asymptomatic/afebrile pt. CBC shows increasing WBC. BMP shows hypomagnesemia. Magnesium ordered. Blood cultures x 2 ordered. Tylenol and cooling measures for pyrexia. Will give 1 dose of Ceftriaxone/Azithromycin now. Consider ID consult in AM. Continue to monitor pt. throughout the night. F/u w/ primary team in AM.    -Rikki Martines PA-C. #[id number].

## 2018-09-28 NOTE — CONSULT NOTE ADULT - CONSULT REASON
rhinoviral infection; abnormal chest CT; pneumonia rhinoviral infection; abnormal chest CT; pneumonia; dyspnea; hypoxemia

## 2018-09-28 NOTE — CONSULT NOTE ADULT - SUBJECTIVE AND OBJECTIVE BOX
[facility] - [facility]  CONSULT NOTE    HPI: 68 year old gentleman, former smoker, with history HTN, HLD and DM. He also has a history of urolithiasis treated in the past with stent placement and lithotripsy and admitted on  with severe left sided flank pain radiating to the left groin. CT scan of the abdomen/pelvis revealed an obstructing stone in the left uretero-pelvic junction causing mild hydroureteronephrosis with perinephric stranding. He has been treated with IV fluids, analgesics, flomax and ambulation with improvement likely due to the passage of the stone. Last night, the patient developed fever ~ 102F associated with shortness of breath and hypoxemia.    PMHX:  Renal colic  Hypertension  Hypercholesteremia  Diabetes      PSHX:  ureteral stent placement/lithotipsy      FAMILY HISTORY:      SOCIAL HISTORY:  ; lives with spouse; lifelong non-smoker    Pulmonary Medications:   ALBUTerol/ipratropium for Nebulization 3 milliLiter(s) Nebulizer every 6 hours PRN      Antimicrobials:  cefepime   IVPB      cefepime   IVPB 1000 milliGRAM(s) IV Intermittent once  cefepime   IVPB 1000 milliGRAM(s) IV Intermittent every 8 hours      Cardiology:  amLODIPine   Tablet 10 milliGRAM(s) Oral daily  lisinopril 40 milliGRAM(s) Oral daily  metoprolol succinate ER 25 milliGRAM(s) Oral daily  tamsulosin 0.4 milliGRAM(s) Oral at bedtime      Other:  acetaminophen   Tablet .. 650 milliGRAM(s) Oral every 6 hours PRN  aspirin  chewable 81 milliGRAM(s) Oral daily  atorvastatin 80 milliGRAM(s) Oral at bedtime  dextrose 40% Gel 15 Gram(s) Oral once PRN  dextrose 5%. 1000 milliLiter(s) IV Continuous <Continuous>  dextrose 50% Injectable 12.5 Gram(s) IV Push once  dextrose 50% Injectable 25 Gram(s) IV Push once  dextrose 50% Injectable 25 Gram(s) IV Push once  docusate sodium 100 milliGRAM(s) Oral three times a day  glucagon  Injectable 1 milliGRAM(s) IntraMuscular once PRN  influenza   Vaccine 0.5 milliLiter(s) IntraMuscular once  insulin lispro (HumaLOG) corrective regimen sliding scale   SubCutaneous three times a day before meals  insulin lispro (HumaLOG) corrective regimen sliding scale   SubCutaneous at bedtime  morphine  - Injectable 2 milliGRAM(s) IV Push every 4 hours PRN  oxyCODONE    5 mG/acetaminophen 325 mG 1 Tablet(s) Oral every 4 hours PRN  senna 2 Tablet(s) Oral at bedtime  sodium chloride 0.9% lock flush 3 milliLiter(s) IV Push every 8 hours      Allergies    No Known Allergies    HOME MEDICATIONS: see  H & P    REVIEW OF SYSTEMS:  Constitutional: As per HPI  HEENT: Within normal limits  CV: As per HPI  Resp: As per HPI  GI: Within normal limits   : Within normal limits  Musculoskeletal: Within normal limits  Skin: Within normal limits  Neurological: Within normal limits  Psychiatric: Within normal limits  Endocrine: Within normal limits  Hematologic/Lymphatic: Within normal limits  Allergic/Immunologic: Within normal limits    [x] All other systems negative    OBJECTIVE:    I&O's Detail    27 Sep 2018 07:01  -  28 Sep 2018 07:00  --------------------------------------------------------  IN:    Oral Fluid: 240 mL    sodium chloride 0.9%: 400 mL  Total IN: 640 mL    OUT:    Voided: 1300 mL  Total OUT: 1300 mL    Total NET: -660 mL    POCT Blood Glucose.: 152 mg/dL (28 Sep 2018 12:04)  POCT Blood Glucose.: 147 mg/dL (28 Sep 2018 08:12)  POCT Blood Glucose.: 146 mg/dL (27 Sep 2018 22:46)  POCT Blood Glucose.: 154 mg/dL (27 Sep 2018 16:36)      PHYSICAL EXAM:  ICU Vital Signs Last 24 Hrs  T(C): 37.1 (28 Sep 2018 09:10), Max: 38.8 (27 Sep 2018 20:11)  T(F): 98.8 (28 Sep 2018 09:10), Max: 101.9 (27 Sep 2018 20:11)  HR: 83 (28 Sep 2018 09:10) (75 - 95)  BP: 158/84 (28 Sep 2018 09:10) (133/67 - 182/76)  BP(mean): --  ABP: --  ABP(mean): --  RR: 18 (28 Sep 2018 09:10) (18 - 18)  SpO2: 92% (28 Sep 2018 09:10) (92% - 95%)    General: Awake. Alert. Cooperative. No distress. Appears stated age 	  HEENT:   Atraumatic. Normocephalic. Anicteric. Normal oral mucosa. PERRL. EOMI.  Neck: Supple. Trachea midline. Thyroid without enlargement/tenderness/nodules. No carotid bruit. No JVD.	  Cardiovascular: Regular rate and rhythm. S1 S2 normal. No murmurs, rubs or gallops.  Respiratory: Respirations unlabored. Clear to auscultation and percussion bilaterally. No curvature.  Abdomen: Soft. Non-tender. Non-distended. No organomegaly. No masses. Normal bowel sounds.  Extremities: Warm to touch. No clubbing or cyanosis. No pedal edema.  Pulses: 2+ peripheral pulses all extremities.	  Skin: Normal skin color. No rashes or lesions. No ecchymoses. No cyanosis. Warm to touch.  Lymph Nodes: Cervical, supraclavicular and axillary nodes normal  Neurological: Motor and sensory examination equal and normal. A and O x 3  Psychiatry: Appropriate mood and affect.      LABS:                          10.4   7.63  )-----------( 260      ( 28 Sep 2018 08:29 )             33.1                         12.0   10.1  )-----------( 258      ( 27 Sep 2018 18:55 )             36.3         140  |  103  |  11  ----------------------------<  157<H>  3.3<L>   |  23  |  0.80      141  |  103  |  12  ----------------------------<  217<H>  3.7   |  24  |  0.88    Ca      9.2          Ca      9.7            Mg       1.5         Mg       1.2         TPro  7.8  /  Alb  4.4  /  TBili  0.3  /  DBili  x   /  AST  17  /  ALT  18  /  AlkPhos  101  -                      MICROBIOLOGY:   Urinalysis Basic - ( 28 Sep 2018 09:47 )    Color: Yellow / Appearance: Clear / S.011 / pH: x  Gluc: x / Ketone: Negative  / Bili: Negative / Urobili: Negative   Blood: x / Protein: 100 mg/dL / Nitrite: Negative   Leuk Esterase: Negative / RBC: 1 /hpf / WBC x   Sq Epi: x / Non Sq Epi: 0 /hpf / Bacteria: Negative    Culture - Urine (18 @ 03:23)    Specimen Source: .Urine Clean Catch (Midstream)    Culture Results:   <10,000 CFU/ml  Normal Urogenital andrés present    Rapid Respiratory Viral Panel (18 @ 19:44)    Rapid RVP Result: Detected: The FilmArray RVP Rapid uses polymerase chain reaction (PCR) and melt  curve analysis to screen for adenovirus; coronavirus HKU1, NL63, 229E,  OC43; human metapneumovirus (hMPV); human enterovirus/rhinovirus  (Entero/RV); influenza A; influenza A/H1;influenza A/H3; influenza  A/H1-2009; influenza B; parainfluenza viruses 1, 2, 3, 4; respiratory  syncytial virus; Bordetella pertussis; Mycoplasma pneumoniae; and  Chlamydophila pneumoniae.    Entero/Rhinovirus (RapRVP): Detected    RADIOLOGY:  [x ] Chest radiographs reviewed and interpreted by me    < from: Xray Chest 1 View- PORTABLE-Urgent (18 @ 12:03) >    EXAM:  XR CHEST PORTABLE URGENT 1V                          PROCEDURE DATE:  2018      INTERPRETATION:  EXAMINATION: XR CHEST PORTABLE URGENT 1V    CLINICAL INDICATION: R/O pul edema    TECHNIQUE: Single portable view of the chest wasobtained.    COMPARISON: Chest radiograph 2018.    FINDINGS:    The cardiomediastinal silhouette is stable. There is no pneumothorax.   There is no pulmonary edema. There are opacities in both lower lobes, new   as compared with the prior study.There are degenerative changes of the   visualized spine.    IMPRESSION:    New opacities in both lower lobes, which may represent atelectasis versus   pneumonia. Correlation with clinical data and follow-up is advised.    KIRAN RON M.D., ATTENDING RADIOLOGIST  This document has been electronically signed. Sep 27 2018 12:13PM    < end of copied text >  ---------------------------------------------------------------------------------------------------------------    < from: CT Chest No Cont (18 @ 21:38) >    EXAM:  CT CHEST                          PROCEDURE DATE:  2018      INTERPRETATION:  CT CHEST WITHOUT CONTRAST    INDICATION: Shortness of breath, Rales auscultation bilaterally    TECHNIQUE: Unenhanced helical images were obtained ofthe chest. Coronal   and sagittal images were reconstructed. Maximum intensity projection   images were generated.     COMPARISON: No prior chest CT.    FINDINGS:     Lungs, pleura And Airways:     The airways are patent.  Diffuse bronchial wall thickening. No   bronchiectasis.    Peribronchovascular groundglass nodularities within the right upper and   lower lobes. Mild bronchocentric opacity within the basilar left lower   lobe. Multiple bilateral pleural effusions and subsegmental atelectasis   of the basilar lower lobes.    Mediastinum: There are no enlarged chest lymph nodes. The visualized   portion of the thyroid gland is unremarkable.       Heart and Vasculature: The heart is normal in size.  No pericardial   effusion..   Coronary artery disease with calcified plaque involving the right   coronary artery and left anterior descending artery. Mild aortic valve   leaflet calcifications..    The main pulmonary artery is not enlarged. No aortic aneurysm.    Upper Abdomen: Calcific atherosclerotic of the aorta and its branches.    Bones And Soft Tissues: The bones are unremarkable.  Bilateral   gynecomastia.    IMPRESSION:     1.  Bronchial wall thickening, right lung peribronchovascular   nodularities and mild bronchocentric opacity within the right lower lobe   possibly representing bronchopneumonia, most likely atypical infection   such as viral. Recommend follow-up in 4 weeks.    2.  Small bilateral pleural effusions.    3.  Coronary atherosclerosis.    STAR PRITCHARD, RADIOLOGY RESIDENT  This document has been electronically signed.  JAVIER JOSEPH M.D., ATTENDING RADIOLOGIST  This document has been electronically signed. Sep 28 2018 10:55AM      < end of copied text >    ---------------------------------------------------------------------------------------------------------------  < from: CT Abdomen and Pelvis No Cont (18 @ 20:18) >    EXAM:  CT ABDOMEN AND PELVIS                          PROCEDURE DATE:  2018      INTERPRETATION:  CLINICAL INFORMATION:  Left flank pain.  History of   renal stones.    TECHNIQUE:   Axial CT images were acquired through the abdomen and pelvis   Intravenous contrast:  None.  Oral contrast: None.   Coronal and sagittal reformats were generated.     COMPARISON STUDY:  2016.    FINDINGS:  Visualized lower chest: Atherosclerotic calcification of the coronary   arteries.  Mild bilateral gynecomastia.    Liver: Right hepatic lobe measures 21-20 cm in length, unchanged from   2016.  Bile ducts: Normal caliber.  Gallbladder: Within normal limits.  Spleen: Within normal limits.  Pancreas: Within normal limits.  Adrenal glands: Within normal limits.  Kidneys/ureters: Mild left hydroureteronephrosis and perinephric fat   stranding caused by an approximately 5 x 3 mm stone located in the distal   left ureter, immediately proximal to uterovesical junction.  A 3 mm   calcificfocus in the right lower pole (2:56) likely reflects a   nonobstructing renal stone.    Bladder: Within normal limits.  Reproductive organs: The prostate measures approximately 4.3 x 5.5 x 4.5   cm.      Bowel: Small hiatal hernia.  No bowel obstruction.  Normal appendix.    Mild sigmoid diverticulosis.  Peritoneum: No ascites.    Retroperitoneum: Lymphadenopathy.  Vasculature: Scattered atherosclerotic calcifications of the aortoiliac   tree.    Abdominal wall/soft tissues: Within normal limits.  Bones: An approximately 3.2 x 1.1 cm sclerotic focus in the right ilium   (2:91) is grossly stable from 2016.  Degenerative changes in the   spine; no clinically significant spinal canal stenosis is visualized by   CT technique.  Mild bilateral neural foraminal narrowing at L2-L3 through   L5-S1.      IMPRESSION:  Mild left hydroureteronephrosis and perinephric fat stranding caused by   an approximately 5 x 3 mm stone located in the distal left ureter,   immediately proximal to the uterovesical junction.  Superimposed   genitourinary infection should be excluded based on clinical symptoms and   laboratory values.    KAREN LOPEZ M.D.,ATTENDING RADIOLOGIST  This document has been electronically signed. Sep 24 2018  8:41PM     < end of copied text >  --------------------------------------------------------------------------------------------------------------- [facility] - [facility]  CONSULT NOTE    HPI: 68 year old gentleman, lifelong non-smoker, with history HTN, HLD and DM. He also has a history of multiple bouts of urolithiasis treated once in the past with stent placement and lithotripsy. He was admitted on  with severe left sided flank pain radiating to the left groin. CT scan of the abdomen/pelvis revealed a stone in the left uretero-pelvic junction causing mild hydroureteronephrosis with perinephric stranding. He has been treated with IV fluids, analgesics, flomax and ambulation with resolution of his pain likely due to passage of the stone. Due to evidence of coronary artery calcification on chest CT, the patient is having an ischemic evaluation including and ECHO ([facility]) and stress test. Last night, the patient developed a fever ~ 102F associated with shortness of breath. He was noted to be hypoxic after being awoken from sleep. He has no rhinorrhea, nasal congestion, post nasal drip or sore throat. He has no cough, sputum production, chest congestion or wheeze. He has no dysuria, urinary urgency or frequency. The patient's wife sleeps in a separate room due to his loud snoring. He is unable to sleep on his back due to bouts of choking which wake him from sleep. He has nocturia and severe daytime fatigue.    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 650 milliGRAM(s) Oral every 6 hours PRN  aspirin  chewable 81 milliGRAM(s) Oral daily  atorvastatin 80 milliGRAM(s) Oral at bedtime  dextrose 40% Gel 15 Gram(s) Oral once PRN  dextrose 5%. 1000 milliLiter(s) IV Continuous <Continuous>  dextrose 50% Injectable 12.5 Gram(s) IV Push once  dextrose 50% Injectable 25 Gram(s) IV Push once  dextrose 50% Injectable 25 Gram(s) IV Push once  docusate sodium 100 milliGRAM(s) Oral three times a day  glucagon  Injectable 1 milliGRAM(s) IntraMuscular once PRN  influenza   Vaccine 0.5 milliLiter(s) IntraMuscular once  insulin lispro (HumaLOG) corrective regimen sliding scale   SubCutaneous three times a day before meals  insulin lispro (HumaLOG) corrective regimen sliding scale   SubCutaneous at bedtime  morphine  - Injectable 2 milliGRAM(s) IV Push every 4 hours PRN  oxyCODONE    5 mG/acetaminophen 325 mG 1 Tablet(s) Oral every 4 hours PRN  senna 2 Tablet(s) Oral at bedtime  sodium chloride 0.9% lock flush 3 milliLiter(s) IV Push every 8 hours      Allergies    No Known Allergies    HOME MEDICATIONS: see  H & P    REVIEW OF SYSTEMS:  Constitutional: As per HPI  HEENT: Within normal limits  CV: As per HPI  Resp: As per HPI  GI: Within normal limits   : As per HPI  Musculoskeletal: Within normal limits  Skin: Within normal limits  Neurological: Within normal limits  Psychiatric: Within normal limits  Endocrine: Within normal limits  Hematologic/Lymphatic: Within normal limits  Allergic/Immunologic: Within normal limits    [x] All other systems negative    OBJECTIVE:    I&O's Detail    27 Sep 2018 07:01  -  28 Sep 2018 07:00  --------------------------------------------------------  IN:    Oral Fluid: 240 mL    sodium chloride 0.9%: 400 mL  Total IN: 640 mL    OUT:    Voided: 1300 mL  Total OUT: 1300 mL    Total NET: -660 mL    POCT Blood Glucose.: 152 mg/dL (28 Sep 2018 12:04)  POCT Blood Glucose.: 147 mg/dL (28 Sep 2018 08:12)  POCT Blood Glucose.: 146 mg/dL (27 Sep 2018 22:46)  POCT Blood Glucose.: 154 mg/dL (27 Sep 2018 16:36)      PHYSICAL EXAM:  ICU Vital Signs Last 24 Hrs  T(C): 37.1 (28 Sep 2018 09:10), Max: 38.8 (27 Sep 2018 20:11)  T(F): 98.8 (28 Sep 2018 09:10), Max: 101.9 (27 Sep 2018 20:11)  HR: 83 (28 Sep 2018 09:10) (75 - 95)  BP: 158/84 (28 Sep 2018 09:10) (133/67 - 182/76)  BP(mean): --  ABP: --  ABP(mean): --  RR: 18 (28 Sep 2018 09:10) (18 - 18)  SpO2: 92% (28 Sep 2018 09:10) (92% - 95%) on room air    General: Awake. Alert. Cooperative. No distress. Appears stated age 	  HEENT:   Atraumatic. Normocephalic. Anicteric. Normal oral mucosa. PERRL. EOMI. Mallampati class IV airway  Neck: Supple. Trachea midline. Thyroid without enlargement/tenderness/nodules. No carotid bruit. No JVD. Short and wide  Cardiovascular: Regular rate and rhythm. S1 S2 normal. No murmurs, rubs or gallops.  Respiratory: Respirations unlabored. Diffuse rales. No curvature.  Abdomen: Soft. Non-tender. Non-distended. No organomegaly. No masses. Normal bowel sounds.  Extremities: Warm to touch. No clubbing or cyanosis. Mild pretibial edema  Pulses: 2+ peripheral pulses all extremities.	  Skin: Normal skin color. No rashes or lesions. No ecchymoses. No cyanosis. Warm to touch.  Lymph Nodes: Cervical, supraclavicular and axillary nodes normal  Neurological: Motor and sensory examination equal and normal. A and O x 3  Psychiatry: Appropriate mood and affect.      LABS:                          10.4   7.63  )-----------( 260      ( 28 Sep 2018 08:29 )             33.1                         12.0   10.1  )-----------( 258      ( 27 Sep 2018 18:55 )             36.3     09-28    140  |  103  |  11  ----------------------------<  157<H>  3.3<L>   |  23  |  0.80        141  |  103  |  12  ----------------------------<  217<H>  3.7   |  24  |  0.88    Ca      9.2          Ca      9.7            Mg       1.5         Mg       1.2         TPro  7.8  /  Alb  4.4  /  TBili  0.3  /  DBili  x   /  AST  17  /  ALT  18  /  AlkPhos  101      < from: TTE with Doppler (w/Cont) (18 @ 07:51) >    Patient name: RAY DUNCAN  YOB: 1950   Age: 68 (M)   MR#: [id number]  Study Date: 2018  Location: [address] [place], [place] [id number]: Марина Morrow [facility]  Study quality: Technically difficult  Referring Physician: Nils Salazar MD  Blood Pressure: 149/65 mmHg  Height: 173 cm  Weight: 91 kg  BSA: 2 m2  ------------------------------------------------------------------------  PROCEDURE: Transthoracic echocardiogram with 2-D, M-Mode  and complete spectral and color flow Doppler. Verbal  consent was obtained for injection of  Ultrasonic Enhancing  Agent following a discussion of risks and benefits.  Following intravenous injection of Ultrasonic Enhancing  Agent , harmonic imaging was performed.  INDICATION: Essential (primary) hypertension (I10)  ------------------------------------------------------------------------  Dimensions:    Normal Values:  LA:     4.4    2.0 - 4.0 cm  Ao:     3.2    2.0 - 3.8 cm  SEPTUM: 1.1    0.6 - 1.2 cm  PWT:    1.0    0.6 - 1.1 cm  LVIDd: 4.5    3.0 - 5.6 cm  LVIDs:  2.6    1.8 - 4.0 cm  Derived variables:  LVMI: 80 g/m2  RWT: 0.44  Fractional short: 42 %  EF (Visual Estimate): 60-65 %  Doppler Peak Velocity (m/sec): AoV=1.3  ------------------------------------------------------------------------  Observations:  Mitral Valve: Normal mitral valve. Minimal mitral  regurgitation.  Aortic Valve/Aorta: Normal trileaflet aortic valve. Peak  transaortic valve gradient equals 7 mm Hg, mean transaortic  valve gradient equals 5 mm Hg, aortic valve velocity time  integral equals 30 cm.  Aortic Root: 3.2 cm.  Left Atrium: Normal left atrium.  Left Ventricle: Normal left ventricular systolic function.   Endocardial visualization enhanced with intravenous  injection of Ultrasonic Enhancing Agent (Definity). Normal  left ventricular internal dimensions and wall thicknesses.  Normal diastolic function  Right Heart: Normal right atrium. Normal right ventricular  size and function. Normal tricuspid valve. Minimal  tricuspid regurgitation. Normal pulmonic valve.  Pericardium/Pleura: Normal pericardium with no pericardial  effusion.  Hemodynamic: Estimated right atrial pressure is 8 mm Hg.  ------------------------------------------------------------------------  Conclusions:  1. Normal left ventricular internal dimensions and wall  thicknesses.  2. Normal left ventricular systolic function.   Endocardial  visualization enhanced with intravenous injection of  Ultrasonic Enhancing Agent (Definity).  *** No previous Echo exam.  ------------------------------------------------------------------------  Confirmed on  2018 - 10:16:30 by Bertin Parra M.D.  ------------------------------------------------------------------------    < end of copied text >  ---------------------------------------------------------------------------------------------------------------  MICROBIOLOGY:   Urinalysis Basic - ( 28 Sep 2018 09:47 )    Color: Yellow / Appearance: Clear / S.011 / pH: x  Gluc: x / Ketone: Negative  / Bili: Negative / Urobili: Negative   Blood: x / Protein: 100 mg/dL / Nitrite: Negative   Leuk Esterase: Negative / RBC: 1 /hpf / WBC x   Sq Epi: x / Non Sq Epi: 0 /hpf / Bacteria: Negative    Culture - Urine (18 @ 03:23)    Specimen Source: .Urine Clean Catch (Midstream)    Culture Results:   <10,000 CFU/ml  Normal Urogenital andrés present    Rapid Respiratory Viral Panel (18 @ 19:44)    Rapid RVP Result: Detected: The FilmArray RVP Rapid uses polymerase chain reaction (PCR) and melt  curve analysis to screen for adenovirus; coronavirus HKU1, NL63, 229E,  OC43; human metapneumovirus (hMPV); human enterovirus/rhinovirus  (Entero/RV); influenza A; influenza A/H1;influenza A/H3; influenza  A/H1-2009; influenza B; parainfluenza viruses 1, 2, 3, 4; respiratory  syncytial virus; Bordetella pertussis; Mycoplasma pneumoniae; and  Chlamydophila pneumoniae.    Entero/Rhinovirus (RapRVP): Detected    RADIOLOGY:  [x ] Chest radiographs reviewed and interpreted by me    < from: Xray Chest 1 View- PORTABLE-Urgent (18 @ 12:03) >    EXAM:  XR CHEST PORTABLE URGENT 1V                          PROCEDURE DATE:  2018      INTERPRETATION:  EXAMINATION: XR CHEST PORTABLE URGENT 1V    CLINICAL INDICATION: R/O pulmonary edema    TECHNIQUE: Single portable view of the chest was obtained.    COMPARISON: Chest radiograph 2018.    FINDINGS:    The cardiomediastinal silhouette is stable. There is no pneumothorax.   There is no pulmonary edema. There are opacities in both lower lobes, new   as compared with the prior study. There are degenerative changes of the   visualized spine.    IMPRESSION:    New opacities in both lower lobes, which may represent atelectasis versus   pneumonia. Correlation with clinical data and follow-up is advised.    KIRAN RON M.D., ATTENDING RADIOLOGIST  This document has been electronically signed. Sep 27 2018 12:13PM    < end of copied text >  ---------------------------------------------------------------------------------------------------------------    < from: CT Chest No Cont (18 @ 21:38) >    EXAM:  CT CHEST                          PROCEDURE DATE:  2018      INTERPRETATION:  CT CHEST WITHOUT CONTRAST    INDICATION: Shortness of breath, Rales auscultation bilaterally    TECHNIQUE: Unenhanced helical images were obtained ofthe chest. Coronal   and sagittal images were reconstructed. Maximum intensity projection   images were generated.     COMPARISON: No prior chest CT.    FINDINGS:     Lungs, pleura And Airways:     The airways are patent.  Diffuse bronchial wall thickening. No   bronchiectasis.    Peribronchovascular groundglass nodularities within the right upper and   lower lobes. Mild bronchocentric opacity within the basilar left lower   lobe. Multiple bilateral pleural effusions and subsegmental atelectasis   of the basilar lower lobes.    Mediastinum: There are no enlarged chest lymph nodes. The visualized   portion of the thyroid gland is unremarkable.       Heart and Vasculature: The heart is normal in size.  No pericardial   effusion. Coronary artery disease with calcified plaque involving the right   coronary artery and left anterior descending artery. Mild aortic valve   leaflet calcifications..    The main pulmonary artery is not enlarged. No aortic aneurysm.    Upper Abdomen: Calcific atherosclerotic of the aorta and its branches.    Bones And Soft Tissues: The bones are unremarkable.  Bilateral   gynecomastia.    IMPRESSION:     1.  Bronchial wall thickening, right lung peribronchovascular   nodularities and mild bronchocentric opacity within the right lower lobe   possibly representing bronchopneumonia, most likely atypical infection   such as viral. Recommend follow-up in 4 weeks.    2.  Small bilateral pleural effusions.    3.  Coronary atherosclerosis.    STAR PRITCHARD, RADIOLOGY RESIDENT  This document has been electronically signed.  JAVIER JOSEPH M.D., ATTENDING RADIOLOGIST  This document has been electronically signed. Sep 28 2018 10:55AM      < end of copied text >    ---------------------------------------------------------------------------------------------------------------  < from: CT Abdomen and Pelvis No Cont (18 @ 20:18) >    EXAM:  CT ABDOMEN AND PELVIS                          PROCEDURE DATE:  2018      INTERPRETATION:  CLINICAL INFORMATION:  Left flank pain.  History of   renal stones.    TECHNIQUE:   Axial CT images were acquired through the abdomen and pelvis   Intravenous contrast:  None.  Oral contrast: None.   Coronal and sagittal reformats were generated.     COMPARISON STUDY:  2016.    FINDINGS:  Visualized lower chest: Atherosclerotic calcification of the coronary   arteries.  Mild bilateral gynecomastia.    Liver: Right hepatic lobe measures 21-20 cm in length, unchanged from   2016.  Bile ducts: Normal caliber.  Gallbladder: Within normal limits.  Spleen: Within normal limits.  Pancreas: Within normal limits.  Adrenal glands: Within normal limits.  Kidneys/ureters: Mild left hydroureteronephrosis and perinephric fat   stranding caused by an approximately 5 x 3 mm stone located in the distal   left ureter, immediately proximal to uterovesical junction.  A 3 mm   calcificfocus in the right lower pole (2:56) likely reflects a   nonobstructing renal stone.    Bladder: Within normal limits.  Reproductive organs: The prostate measures approximately 4.3 x 5.5 x 4.5   cm.      Bowel: Small hiatal hernia.  No bowel obstruction.  Normal appendix.    Mild sigmoid diverticulosis.  Peritoneum: No ascites.    Retroperitoneum: Lymphadenopathy.  Vasculature: Scattered atherosclerotic calcifications of the aortoiliac   tree.    Abdominal wall/soft tissues: Within normal limits.  Bones: An approximately 3.2 x 1.1 cm sclerotic focus in the right ilium   (2:91) is grossly stable from 2016.  Degenerative changes in the   spine; no clinically significant spinal canal stenosis is visualized by   CT technique.  Mild bilateral neural foraminal narrowing at L2-L3 through   L5-S1.      IMPRESSION:  Mild left hydroureteronephrosis and perinephric fat stranding caused by   an approximately 5 x 3 mm stone located in the distal left ureter,   immediately proximal to the uterovesical junction.  Superimposed   genitourinary infection should be excluded based on clinical symptoms and   laboratory values.    KAREN LOPEZ M.D.,ATTENDING RADIOLOGIST  This document has been electronically signed. Sep 24 2018  8:41PM     < end of copied text >  ---------------------------------------------------------------------------------------------------------------

## 2018-09-28 NOTE — CONSULT NOTE ADULT - ASSESSMENT
ASSESSMENT:    admitted with renal colic - resolved with conservative therapy    isolated fever - RVP (+) for rhinovirus - no evidence of UTI - chest CT with evidence of diffuse bronchial wall thickening and peribronchovascular ground glass nodular opacities suggestive of atypical pneumonia/bronchitis    hypoxemia - multifactorial  1) obesity with restrictive lung disease   2) likely severe underlying obstructive sleep apnea  3) atelectasis  4) small pleural effusions  5) rhinoviral infection without bronchospasm    HTN/HLD/DM/calcified coronary arteries on chest CT - r/o significant CAD    PLAN/RECOMMENDATIONS:    trial off supplemental oxygen  outpatient formal sleep study  weight reduction encouraged  continue cefepime pending results of cultures - if negative, would complete a 7 day course of levaquin  no indication for pulmonary medications - bronchodilators if needed  diurese as tolerated by renal function and hemodynamics  cardiac meds: ASA/lipitor/norvasc/lisinopril/toprol XL - outpatient stress test  bowel regimen    Thank you for the courtesy of this referral. Plan of care discussed with the patient at bedside and the dedicated floor NP.    Gil Corado MD, [facility] - [phone]  Pulmonary Medicine ASSESSMENT:    admitted with renal colic - resolved with conservative therapy    isolated fever - RVP (+) for rhinovirus - no evidence of UTI - chest CT with evidence of diffuse bronchial wall thickening and peribronchovascular ground glass nodular opacities suggestive of atypical pneumonia/bronchitis    hypoxemia - multifactorial  1) obesity with restrictive lung disease   2) likely severe underlying obstructive sleep apnea  3) atelectasis  4) small pleural effusions  5) rhinoviral infection without bronchospasm    HTN/HLD/DM/calcified coronary arteries on chest CT - r/o significant CAD    PLAN/RECOMMENDATIONS:    trial off supplemental oxygen  incentive spirometry  outpatient formal sleep study  weight reduction encouraged  continue cefepime pending results of cultures - if negative, would complete a 7 day course of levaquin  no indication for pulmonary medications - bronchodilators if needed  diurese as tolerated by renal function and hemodynamics  cardiac meds: ASA/lipitor/norvasc/lisinopril/toprol XL - outpatient stress test  bowel regimen    Thank you for the courtesy of this referral. Plan of care discussed with the patient at bedside and the dedicated floor NP.    Gil Corado MD, [facility] - [phone]  Pulmonary Medicine

## 2018-09-28 NOTE — CONSULT NOTE ADULT - SUBJECTIVE AND OBJECTIVE BOX
Patient is a 68y old  Male who presents with a chief complaint of flank pain (27 Sep 2018 12:08)    HPI:  The patient is a 68y Male complaining of flank pain.	   68M HTN, HLD, DM h/o renal stones requiring a stent and lithotripsy p/w flank pain for 1 day. Pt reports last night he began to feel and sore/sharp flank pain in his L flank with radiation to his groin. Pain waxes and wanes in intensity, currently 8/10. Took tylenol at 12pm advil at 4pm w/o relief. Reports some subjective fevers in the afternoon and also associated nausea. No CP, SOB, no dysuria or urgency. (25 Sep 2018 11:46)      PAST MEDICAL & SURGICAL HISTORY:  Renal colic  Hypertension  Hypercholesteremia  Diabetes      Social history:    FAMILY HISTORY:    REVIEW OF SYSTEMS  General:	Denies any malaise fatigue or chills. Fevers absent    Skin:No rash  	  Ophthalmologic:Denies any visual complaints,discharge redness or photophobia  	  ENMT:No nasal discharge,headache,sinus congestion or throat pain.No dental complaints    Respiratory and Thorax:No cough,sputum or chest pain shortness of breath  	  Cardiovascular:	No chest pain,palpitaions or dizziness    Gastrointestinal:	NO nausea,abdominal pain or diarrhea.    Genitourinary:	No dysuria,frequency. No flank pain    Musculoskeletal:	No joint swelling or pain.No weakness    Neurological:No confusion,diziness.No extremity weakness.No bladder or bowel incontinence	         Hematology/Lymphatics:	No LN swelling.No gum bleeding     Endocrine:	No recent weight gain or loss.No abnormal heat/cold intolerance    Allergic/Immunologic:	No hives or rash   Allergies    No Known Allergies    Intolerances        Antimicrobials:          Vital Signs Last 24 Hrs  T(C): 37.1 (28 Sep 2018 09:10), Max: 38.8 (27 Sep 2018 20:11)  T(F): 98.8 (28 Sep 2018 09:10), Max: 101.9 (27 Sep 2018 20:11)  HR: 83 (28 Sep 2018 09:10) (75 - 95)  BP: 158/84 (28 Sep 2018 09:10) (133/67 - 182/76)  BP(mean): --  RR: 18 (28 Sep 2018 09:10) (18 - 18)  SpO2: 92% (28 Sep 2018 09:10) (92% - 95%)    PHYSICAL EXAM:Pleasant patient in no acute distress.      Constitutional:Comfortable.Awake and alert  No cachexia     Eyes:PERRL EOMI.NO discharge or conjunctival injection    ENMT:No sinus tenderness.No thrush.No pharyngeal exudate or erythema.Fair dental hygiene    Neck:Supple,No LN,no JVD      Respiratory: decreaased     Cardiovascular:S1 S2 wnl, No murmurs,rub or gallops    Gastrointestinal:Soft BS(+) no tenderness no masses ,No rebound or guarding    Genitourinary:No CVA tendereness     Rectal:    Extremities:No cyanosis,clubbing or edema.    Vascular:peripheral pulses felt    Neurological:AAO X 3,No grossly focal deficits    Skin:No rash     Lymph Nodes:No palpable LNs    Musculoskeletal:No joint swelling or LOM    Psychiatric:Affect normal.                                10.4   7.63  )-----------( 260      ( 28 Sep 2018 08:29 )             33.1         09-28    140  |  103  |  11  ----------------------------<  157<H>  3.3<L>   |  23  |  0.80    Ca    9.2      28 Sep 2018 07:06  Mg     1.5     09-28        RECENT CULTURES:  09-25 @ 03:23  .Urine Clean Catch (Midstream)  --  --  --    <10,000 CFU/ml  Normal Urogenital andrés present  --      MICROBIOLOGY:  Culture Results:   <10,000 CFU/ml  Normal Urogenital andrés present (09-25 @ 03:23)          Radiology:      Assessment:        Recommendations and Plan:    Pager [phone]  After 5 pm/weekends or if no response :[phone]

## 2018-09-28 NOTE — CONSULT NOTE ADULT - ASSESSMENT
68 yr old was admitted with  renal colic and  hydronephrosis   Treated conservatively and colic has resolved.  NL wbc and initial culture of urine contaminated . Last pm with fever, and SOB and decreased sats. Chest xray and ct reviewed .  I suspect that the chest abnormalities ae more likely related to atelectasis than pna and it is  is from splinting. Also concerned about urosepsis with recent obstruction.     start cefepime to cover urosepsis ( and PNA) and await urine cultures.  may need follow up ultrasound to see  if stone has definitely passed ?

## 2018-09-28 NOTE — PROGRESS NOTE ADULT - SUBJECTIVE AND OBJECTIVE BOX
Patient is a 68y old  Male who presents with a chief complaint of flank pain; left ureteral stone (28 Sep 2018 12:44)      SUBJECTIVE / OVERNIGHT EVENTS: c/o abdominal discomfort.  Review of Systems  chest pain no  palpitations no  sob no  nausea no  headache no    MEDICATIONS  (STANDING):  amLODIPine   Tablet 10 milliGRAM(s) Oral daily  aspirin  chewable 81 milliGRAM(s) Oral daily  atorvastatin 80 milliGRAM(s) Oral at bedtime  cefepime   IVPB      cefepime   IVPB 1000 milliGRAM(s) IV Intermittent every 8 hours  dextrose 5%. 1000 milliLiter(s) (50 mL/Hr) IV Continuous <Continuous>  dextrose 50% Injectable 12.5 Gram(s) IV Push once  dextrose 50% Injectable 25 Gram(s) IV Push once  dextrose 50% Injectable 25 Gram(s) IV Push once  docusate sodium 100 milliGRAM(s) Oral three times a day  influenza   Vaccine 0.5 milliLiter(s) IntraMuscular once  insulin lispro (HumaLOG) corrective regimen sliding scale   SubCutaneous three times a day before meals  insulin lispro (HumaLOG) corrective regimen sliding scale   SubCutaneous at bedtime  lisinopril 40 milliGRAM(s) Oral daily  metoprolol succinate ER 25 milliGRAM(s) Oral daily  senna 2 Tablet(s) Oral at bedtime  sodium chloride 0.9% lock flush 3 milliLiter(s) IV Push every 8 hours  tamsulosin 0.4 milliGRAM(s) Oral at bedtime    MEDICATIONS  (PRN):  acetaminophen   Tablet .. 650 milliGRAM(s) Oral every 6 hours PRN Temp greater or equal to 38.5C (101.3F), Mild Pain (1 - 3)  ALBUTerol/ipratropium for Nebulization 3 milliLiter(s) Nebulizer every 6 hours PRN Shortness of Breath and/or Wheezing  dextrose 40% Gel 15 Gram(s) Oral once PRN Blood Glucose LESS THAN 70 milliGRAM(s)/deciliter  glucagon  Injectable 1 milliGRAM(s) IntraMuscular once PRN Glucose LESS THAN 70 milligrams/deciliter  morphine  - Injectable 2 milliGRAM(s) IV Push every 4 hours PRN Severe Pain (7 - 10)  oxyCODONE    5 mG/acetaminophen 325 mG 1 Tablet(s) Oral every 4 hours PRN Moderate Pain (4 - 6)      Vital Signs Last 24 Hrs  T(C): 37.3 (28 Sep 2018 13:35), Max: 38.8 (27 Sep 2018 20:11)  T(F): 99.2 (28 Sep 2018 13:35), Max: 101.9 (27 Sep 2018 20:11)  HR: 74 (28 Sep 2018 13:35) (74 - 90)  BP: 149/69 (28 Sep 2018 13:35) (133/67 - 182/76)  BP(mean): --  RR: 18 (28 Sep 2018 13:35) (18 - 18)  SpO2: 92% (28 Sep 2018 13:35) (92% - 95%)    PHYSICAL EXAM:  GENERAL: NAD, well-developed  HEAD:  Atraumatic, Normocephalic  EYES: EOMI, PERRLA, conjunctiva and sclera clear  NECK: Supple, No JVD  CHEST/LUNG: R base crackles No wheeze  HEART: Regular rate and rhythm; No murmurs, rubs, or gallops  ABDOMEN: Soft, Nontender, Nondistended; Bowel sounds present  EXTREMITIES:  2+ Peripheral Pulses, No clubbing, cyanosis, or edema  PSYCH: AAOx3  NEUROLOGY: non-focal  SKIN: No rashes or lesions    LABS:                        10.4   7.63  )-----------( 260      ( 28 Sep 2018 08:29 )             33.1         140  |  103  |  11  ----------------------------<  157<H>  3.3<L>   |  23  |  0.80    Ca    9.2      28 Sep 2018 07:06  Mg     1.5                 Urinalysis Basic - ( 28 Sep 2018 09:47 )    Color: Yellow / Appearance: Clear / S.011 / pH: x  Gluc: x / Ketone: Negative  / Bili: Negative / Urobili: Negative   Blood: x / Protein: 100 mg/dL / Nitrite: Negative   Leuk Esterase: Negative / RBC: 1 /hpf / WBC x   Sq Epi: x / Non Sq Epi: 0 /hpf / Bacteria: Negative          RADIOLOGY & ADDITIONAL TESTS:    Imaging Personally Reviewed:  < from: CT Chest No Cont (18 @ 21:38) >  IMPRESSION:     1.  Bronchial wall thickening, right lung peribronchovascular   nodularities and mild bronchocentric opacity within the right lower lobe   possibly representing bronchopneumonia, most likely atypical infection   such as viral. Recommend follow-up in 4 weeks.    2.  Small bilateral pleural effusions.    3.  Coronary atherosclerosis.    < end of copied text >    Consultant(s) Notes Reviewed:      Care Discussed with Consultants/Other Providers:

## 2018-09-28 NOTE — PROGRESS NOTE ADULT - ASSESSMENT
68 m with  L kidney stone, Renal colic - CT abdomen pending .  Urology follow.  DC IVF. Fluid load- diurese  Diabetes  - BS control  Hypercholesteremia  - continue meds  Hypertension -continue meds  Coronary calcifications on CT- r/o CAD Cardiology evaluation noted.. Echo pending Stress test pending Cardiology follow noted.   Pneumonia- antibiotics, ID and Pulmonary evaluation noted.  Viral syndrome- supportive care   d/w patient and wife QA  Nils Salazar MD pager [phone]

## 2018-09-28 NOTE — PROGRESS NOTE ADULT - SUBJECTIVE AND OBJECTIVE BOX
Subjective: Patient seen and examined. No new events except as noted.   febrile overnight   +cough     REVIEW OF SYSTEMS:    CONSTITUTIONAL: + weakness,+ fevers or chills  EYES/ENT: No visual changes;  No vertigo or throat pain   NECK: No pain or stiffness  RESPIRATORY: No cough, wheezing, hemoptysis; No shortness of breath  CARDIOVASCULAR: No chest pain or palpitations  GASTROINTESTINAL: No abdominal or epigastric pain. No nausea, vomiting, or hematemesis; No diarrhea or constipation. No melena or hematochezia.  GENITOURINARY: No dysuria, frequency or hematuria  NEUROLOGICAL: No numbness or weakness  SKIN: No itching, burning, rashes, or lesions   All other review of systems is negative unless indicated above.    MEDICATIONS:  MEDICATIONS  (STANDING):  amLODIPine   Tablet 10 milliGRAM(s) Oral daily  aspirin  chewable 81 milliGRAM(s) Oral daily  atorvastatin 80 milliGRAM(s) Oral at bedtime  dextrose 5%. 1000 milliLiter(s) (50 mL/Hr) IV Continuous <Continuous>  dextrose 50% Injectable 12.5 Gram(s) IV Push once  dextrose 50% Injectable 25 Gram(s) IV Push once  dextrose 50% Injectable 25 Gram(s) IV Push once  docusate sodium 100 milliGRAM(s) Oral three times a day  influenza   Vaccine 0.5 milliLiter(s) IntraMuscular once  insulin lispro (HumaLOG) corrective regimen sliding scale   SubCutaneous three times a day before meals  insulin lispro (HumaLOG) corrective regimen sliding scale   SubCutaneous at bedtime  lisinopril 40 milliGRAM(s) Oral daily  metoprolol succinate ER 25 milliGRAM(s) Oral daily  senna 2 Tablet(s) Oral at bedtime  sodium chloride 0.9% lock flush 3 milliLiter(s) IV Push every 8 hours  tamsulosin 0.4 milliGRAM(s) Oral at bedtime      PHYSICAL EXAM:  T(C): 37.1 (09-28-18 @ 09:10), Max: 38.8 (09-27-18 @ 20:11)  HR: 83 (09-28-18 @ 09:10) (75 - 95)  BP: 158/84 (09-28-18 @ 09:10) (133/67 - 182/76)  RR: 18 (09-28-18 @ 09:10) (18 - 18)  SpO2: 92% (09-28-18 @ 09:10) (92% - 95%)  Wt(kg): --  I&O's Summary    27 Sep 2018 07:01  -  28 Sep 2018 07:00  --------------------------------------------------------  IN: 640 mL / OUT: 1300 mL / NET: -660 mL          Appearance: Normal	  HEENT:   Normal oral mucosa, PERRL, EOMI	  Lymphatic: No lymphadenopathy , no edema  Cardiovascular: Normal S1 S2, No JVD, No murmurs , Peripheral pulses palpable 2+ bilaterally  Respiratory: Decreaed bs   Gastrointestinal:  Soft, Non-tender, + BS	  Skin: No rashes, No ecchymoses, No cyanosis, warm to touch  Musculoskeletal: Normal range of motion, normal strength  Psychiatry:  Mood & affect appropriate  Ext: No edema      LABS:    CARDIAC MARKERS:                                10.4   7.63  )-----------( 260      ( 28 Sep 2018 08:29 )             33.1     09-28    140  |  103  |  11  ----------------------------<  157<H>  3.3<L>   |  23  |  0.80    Ca    9.2      28 Sep 2018 07:06  Mg     1.5     09-28      proBNP:   Lipid Profile:   HgA1c:   TSH:   Rapid Respiratory Viral Panel (09.27.18 @ 19:44)    Rapid RVP Result: Detected: The [employer] RVP Rapid uses polymerase chain reaction (PCR) and melt  curve analysis to screen for adenovirus; coronavirus HKU1, NL63, 229E,  OC43; human metapneumovirus (hMPV); human enterovirus/rhinovirus  (Entero/RV); influenza A; influenza A/H1;influenza A/H3; influenza  A/H1-2009; influenza B; parainfluenza viruses 1, 2, 3, 4; respiratory  syncytial virus; Bordetella pertussis; Mycoplasma pneumoniae; and  Chlamydophila pneumoniae.    Entero/Rhinovirus (RapRVP): Detected              TELEMETRY: 	    ECG:  	  RADIOLOGY:  < from: Xray Chest 1 View- PORTABLE-Urgent (09.27.18 @ 12:03) >    EXAM:  XR CHEST PORTABLE URGENT 1V                            PROCEDURE DATE:  09/27/2018            INTERPRETATION:  EXAMINATION: XR CHEST PORTABLE URGENT 1V    CLINICAL INDICATION: R/O pul edema    TECHNIQUE: Single portable view of the chest wasobtained.    COMPARISON: Chest radiograph 2/25/2018.    FINDINGS:    The cardiomediastinal silhouette is stable. There is no pneumothorax.   There is no pulmonary edema. There are opacities in both lower lobes, new   as compared with the prior study.There are degenerative changes of the   visualized spine.    IMPRESSION:    New opacities in both lower lobes, which may represent atelectasis versus   pneumonia. Correlation with clinical data and follow-up is advised.                    KIRAN RON M.D., ATTENDING RADIOLOGIST  This document has been electronically signed. Sep 27 2018 12:13PM                < end of copied text >    DIAGNOSTIC TESTING:  [ ] Echocardiogram:  [ ]  Catheterization:  [ ] Stress Test:    OTHER:

## 2018-09-29 LAB
ANION GAP SERPL CALC-SCNC: 14 MMOL/L — SIGNIFICANT CHANGE UP (ref 5–17)
BASOPHILS # BLD AUTO: 0.01 K/UL — SIGNIFICANT CHANGE UP (ref 0–0.2)
BASOPHILS NFR BLD AUTO: 0.2 % — SIGNIFICANT CHANGE UP (ref 0–2)
BUN SERPL-MCNC: 17 MG/DL — SIGNIFICANT CHANGE UP (ref 7–23)
CALCIUM SERPL-MCNC: 9.8 MG/DL — SIGNIFICANT CHANGE UP (ref 8.4–10.5)
CHLORIDE SERPL-SCNC: 103 MMOL/L — SIGNIFICANT CHANGE UP (ref 96–108)
CO2 SERPL-SCNC: 25 MMOL/L — SIGNIFICANT CHANGE UP (ref 22–31)
CREAT SERPL-MCNC: 0.86 MG/DL — SIGNIFICANT CHANGE UP (ref 0.5–1.3)
EOSINOPHIL # BLD AUTO: 0.06 K/UL — SIGNIFICANT CHANGE UP (ref 0–0.5)
EOSINOPHIL NFR BLD AUTO: 0.9 % — SIGNIFICANT CHANGE UP (ref 0–6)
GLUCOSE BLDC GLUCOMTR-MCNC: 163 MG/DL — HIGH (ref 70–99)
GLUCOSE BLDC GLUCOMTR-MCNC: 163 MG/DL — HIGH (ref 70–99)
GLUCOSE BLDC GLUCOMTR-MCNC: 203 MG/DL — HIGH (ref 70–99)
GLUCOSE BLDC GLUCOMTR-MCNC: 243 MG/DL — HIGH (ref 70–99)
GLUCOSE SERPL-MCNC: 144 MG/DL — HIGH (ref 70–99)
HCT VFR BLD CALC: 34.8 % — LOW (ref 39–50)
HGB BLD-MCNC: 11.8 G/DL — LOW (ref 13–17)
IMM GRANULOCYTES NFR BLD AUTO: 0.5 % — SIGNIFICANT CHANGE UP (ref 0–1.5)
LYMPHOCYTES # BLD AUTO: 1.43 K/UL — SIGNIFICANT CHANGE UP (ref 1–3.3)
LYMPHOCYTES # BLD AUTO: 22.6 % — SIGNIFICANT CHANGE UP (ref 13–44)
MAGNESIUM SERPL-MCNC: 1.6 MG/DL — SIGNIFICANT CHANGE UP (ref 1.6–2.6)
MCHC RBC-ENTMCNC: 30.6 PG — SIGNIFICANT CHANGE UP (ref 27–34)
MCHC RBC-ENTMCNC: 33.9 GM/DL — SIGNIFICANT CHANGE UP (ref 32–36)
MCV RBC AUTO: 90.2 FL — SIGNIFICANT CHANGE UP (ref 80–100)
MONOCYTES # BLD AUTO: 0.63 K/UL — SIGNIFICANT CHANGE UP (ref 0–0.9)
MONOCYTES NFR BLD AUTO: 10 % — SIGNIFICANT CHANGE UP (ref 2–14)
NEUTROPHILS # BLD AUTO: 4.17 K/UL — SIGNIFICANT CHANGE UP (ref 1.8–7.4)
NEUTROPHILS NFR BLD AUTO: 65.8 % — SIGNIFICANT CHANGE UP (ref 43–77)
PLATELET # BLD AUTO: 242 K/UL — SIGNIFICANT CHANGE UP (ref 150–400)
POTASSIUM SERPL-MCNC: 3.9 MMOL/L — SIGNIFICANT CHANGE UP (ref 3.5–5.3)
POTASSIUM SERPL-SCNC: 3.9 MMOL/L — SIGNIFICANT CHANGE UP (ref 3.5–5.3)
RBC # BLD: 3.86 M/UL — LOW (ref 4.2–5.8)
RBC # FLD: 13.4 % — SIGNIFICANT CHANGE UP (ref 10.3–14.5)
SODIUM SERPL-SCNC: 142 MMOL/L — SIGNIFICANT CHANGE UP (ref 135–145)
WBC # BLD: 6.33 K/UL — SIGNIFICANT CHANGE UP (ref 3.8–10.5)
WBC # FLD AUTO: 6.33 K/UL — SIGNIFICANT CHANGE UP (ref 3.8–10.5)

## 2018-09-29 PROCEDURE — 99232 SBSQ HOSP IP/OBS MODERATE 35: CPT

## 2018-09-29 RX ORDER — MAGNESIUM OXIDE 400 MG ORAL TABLET 241.3 MG
800 TABLET ORAL ONCE
Qty: 0 | Refills: 0 | Status: COMPLETED | OUTPATIENT
Start: 2018-09-29 | End: 2018-09-30

## 2018-09-29 RX ADMIN — CEFEPIME 100 MILLIGRAM(S): 1 INJECTION, POWDER, FOR SOLUTION INTRAMUSCULAR; INTRAVENOUS at 22:16

## 2018-09-29 RX ADMIN — LISINOPRIL 40 MILLIGRAM(S): 2.5 TABLET ORAL at 05:18

## 2018-09-29 RX ADMIN — SODIUM CHLORIDE 3 MILLILITER(S): 9 INJECTION INTRAMUSCULAR; INTRAVENOUS; SUBCUTANEOUS at 06:53

## 2018-09-29 RX ADMIN — ATORVASTATIN CALCIUM 80 MILLIGRAM(S): 80 TABLET, FILM COATED ORAL at 22:14

## 2018-09-29 RX ADMIN — Medication 100 MILLIGRAM(S): at 05:18

## 2018-09-29 RX ADMIN — CEFEPIME 100 MILLIGRAM(S): 1 INJECTION, POWDER, FOR SOLUTION INTRAMUSCULAR; INTRAVENOUS at 14:31

## 2018-09-29 RX ADMIN — AMLODIPINE BESYLATE 10 MILLIGRAM(S): 2.5 TABLET ORAL at 05:18

## 2018-09-29 RX ADMIN — Medication 40 MILLIGRAM(S): at 05:18

## 2018-09-29 RX ADMIN — Medication 0: at 22:17

## 2018-09-29 RX ADMIN — Medication 100 MILLIGRAM(S): at 14:31

## 2018-09-29 RX ADMIN — Medication 4: at 17:02

## 2018-09-29 RX ADMIN — Medication 2: at 08:36

## 2018-09-29 RX ADMIN — CEFEPIME 100 MILLIGRAM(S): 1 INJECTION, POWDER, FOR SOLUTION INTRAMUSCULAR; INTRAVENOUS at 05:18

## 2018-09-29 RX ADMIN — Medication 25 MILLIGRAM(S): at 05:18

## 2018-09-29 RX ADMIN — Medication 100 MILLIGRAM(S): at 22:15

## 2018-09-29 RX ADMIN — TAMSULOSIN HYDROCHLORIDE 0.4 MILLIGRAM(S): 0.4 CAPSULE ORAL at 22:16

## 2018-09-29 RX ADMIN — SODIUM CHLORIDE 3 MILLILITER(S): 9 INJECTION INTRAMUSCULAR; INTRAVENOUS; SUBCUTANEOUS at 14:28

## 2018-09-29 RX ADMIN — Medication 81 MILLIGRAM(S): at 12:38

## 2018-09-29 RX ADMIN — Medication 2: at 12:38

## 2018-09-29 RX ADMIN — SODIUM CHLORIDE 3 MILLILITER(S): 9 INJECTION INTRAMUSCULAR; INTRAVENOUS; SUBCUTANEOUS at 22:26

## 2018-09-29 NOTE — PROGRESS NOTE ADULT - ASSESSMENT
68 yr old was admitted with  renal colic and  hydronephrosis   Treated conservatively and colic has resolved.  NL wbc and initial culture of urine contaminated . Fever on 9/27, and SOB and decreased O2 sats. Chest xray and ct reviewed .  Suspect that the chest abnormalities are more likely related to atelectasis than pna and it is  is from splinting in setting of rhino/enterovirus. Also concerned about urosepsis with recent obstruction.     start cefepime to cover urosepsis ( and PNA) and await repeat urine cultures.  may need follow up ultrasound to see  if stone has definitely passed  Currently afebrile  Continue current antibiotics   Please call [phone] over the weekend with questions.  Jarod Hester MD  pager [phone]  office [phone] 68 yr old was admitted with  renal colic and  hydronephrosis   Treated conservatively and colic has resolved.  NL wbc and initial culture of urine contaminated . Fever on 9/27, and SOB and decreased O2 sats. Chest xray and ct reviewed .  Suspect that the chest abnormalities are more likely related to atelectasis than pna and it is  is from splinting in setting of rhino/enterovirus. Also concerned about urosepsis with recent obstruction.     start cefepime to cover urosepsis ( and PNA) and await repeat urine cultures.  may need follow up ultrasound to see  if stone has definitely passed  F/U repeat CT scan from last night  Currently afebrile  Continue current antibiotics   Please call [phone] over the weekend with questions.  Jarod Hester MD  pager [phone]  office [phone]

## 2018-09-29 NOTE — PROGRESS NOTE ADULT - SUBJECTIVE AND OBJECTIVE BOX
Patient is a 68y old  Male who presents with a chief complaint of flank pain (29 Sep 2018 09:40)      SUBJECTIVE / OVERNIGHT EVENTS: Comfortable without new complaints.   Review of Systems  chest pain no  palpitations no  sob no  nausea no  headache no    MEDICATIONS  (STANDING):  amLODIPine   Tablet 10 milliGRAM(s) Oral daily  aspirin  chewable 81 milliGRAM(s) Oral daily  atorvastatin 80 milliGRAM(s) Oral at bedtime  cefepime   IVPB      cefepime   IVPB 1000 milliGRAM(s) IV Intermittent every 8 hours  dextrose 5%. 1000 milliLiter(s) (50 mL/Hr) IV Continuous <Continuous>  dextrose 50% Injectable 12.5 Gram(s) IV Push once  dextrose 50% Injectable 25 Gram(s) IV Push once  dextrose 50% Injectable 25 Gram(s) IV Push once  docusate sodium 100 milliGRAM(s) Oral three times a day  influenza   Vaccine 0.5 milliLiter(s) IntraMuscular once  insulin lispro (HumaLOG) corrective regimen sliding scale   SubCutaneous three times a day before meals  insulin lispro (HumaLOG) corrective regimen sliding scale   SubCutaneous at bedtime  lisinopril 40 milliGRAM(s) Oral daily  metoprolol succinate ER 25 milliGRAM(s) Oral daily  senna 2 Tablet(s) Oral at bedtime  sodium chloride 0.9% lock flush 3 milliLiter(s) IV Push every 8 hours  tamsulosin 0.4 milliGRAM(s) Oral at bedtime    MEDICATIONS  (PRN):  acetaminophen   Tablet .. 650 milliGRAM(s) Oral every 6 hours PRN Temp greater or equal to 38.5C (101.3F), Mild Pain (1 - 3)  ALBUTerol/ipratropium for Nebulization 3 milliLiter(s) Nebulizer every 6 hours PRN Shortness of Breath and/or Wheezing  dextrose 40% Gel 15 Gram(s) Oral once PRN Blood Glucose LESS THAN 70 milliGRAM(s)/deciliter  glucagon  Injectable 1 milliGRAM(s) IntraMuscular once PRN Glucose LESS THAN 70 milligrams/deciliter  morphine  - Injectable 2 milliGRAM(s) IV Push every 4 hours PRN Severe Pain (7 - 10)  oxyCODONE    5 mG/acetaminophen 325 mG 1 Tablet(s) Oral every 4 hours PRN Moderate Pain (4 - 6)      Vital Signs Last 24 Hrs  T(C): 37 (29 Sep 2018 14:44), Max: 37.3 (28 Sep 2018 21:09)  T(F): 98.6 (29 Sep 2018 14:44), Max: 99.1 (28 Sep 2018 21:09)  HR: 58 (29 Sep 2018 14:44) (58 - 76)  BP: 163/- (29 Sep 2018 14:44) (148/75 - 164/78)  BP(mean): 72 (29 Sep 2018 14:44) (72 - 72)  RR: 18 (29 Sep 2018 14:44) (18 - 18)  SpO2: 94% (29 Sep 2018 14:44) (94% - 96%)    PHYSICAL EXAM:  GENERAL: NAD, well-developed  HEAD:  Atraumatic, Normocephalic  EYES: EOMI, PERRLA, conjunctiva and sclera clear  NECK: Supple, No JVD  CHEST/LUNG: Clear to auscultation bilaterally; No wheeze  HEART: Regular rate and rhythm; No murmurs, rubs, or gallops  ABDOMEN: Soft, Nontender, Nondistended; Bowel sounds present  EXTREMITIES:  2+ Peripheral Pulses, No clubbing, cyanosis, or edema  PSYCH: AAOx3  NEUROLOGY: non-focal  SKIN: No rashes or lesions    LABS:                        11.8   6.33  )-----------( 242      ( 29 Sep 2018 08:03 )             34.8         142  |  103  |  17  ----------------------------<  144<H>  3.9   |  25  |  0.86    Ca    9.8      29 Sep 2018 06:30  Mg     1.6                 Urinalysis Basic - ( 28 Sep 2018 09:47 )    Color: Yellow / Appearance: Clear / S.011 / pH: x  Gluc: x / Ketone: Negative  / Bili: Negative / Urobili: Negative   Blood: x / Protein: 100 mg/dL / Nitrite: Negative   Leuk Esterase: Negative / RBC: 1 /hpf / WBC x   Sq Epi: x / Non Sq Epi: 0 /hpf / Bacteria: Negative        Culture - Blood (collected 28 Sep 2018 00:41)  Source: .Blood Blood-Peripheral  Preliminary Report (29 Sep 2018 01:01):    No growth to date.    Culture - Blood (collected 28 Sep 2018 00:41)  Source: .Blood Blood-Peripheral  Preliminary Report (29 Sep 2018 01:01):    No growth to date.        RADIOLOGY & ADDITIONAL TESTS:    Imaging Personally Reviewed:  < from: CT Abdomen and Pelvis No Cont (18 @ 22:07) >  IMPRESSION:     1.  A 3 mm stone is within the bladder.   2.  Mild left periureteral and perinephric stranding likely representing   recent passage of stone from the ureter.   3.  Left nephrolithiasis.    < end of copied text >    Consultant(s) Notes Reviewed:      Care Discussed with Consultants/Other Providers:

## 2018-09-29 NOTE — PROGRESS NOTE ADULT - ASSESSMENT
68 m with  L kidney stone, Renal colic - Stone in bladder. Urology follow.  DC IVF. Fluid load- diurese  Diabetes  - BS control  Hypercholesteremia  - continue meds  Hypertension -continue meds  Coronary calcifications on CT- r/o CAD Cardiology evaluation noted.. Stress test pending Cardiology follow noted.   Continue antibiotics, ID and Pulmonary evaluation noted.  Viral syndrome- supportive care   d/w patient  QA  Nils Salazar MD pager [phone]

## 2018-09-29 NOTE — PROGRESS NOTE ADULT - SUBJECTIVE AND OBJECTIVE BOX
Subjective: Patient seen and examined. No new events except as noted.   no cp or sob     REVIEW OF SYSTEMS:    CONSTITUTIONAL: + weakness, fevers or chills  EYES/ENT: No visual changes;  No vertigo or throat pain   NECK: No pain or stiffness  RESPIRATORY: No cough, wheezing, hemoptysis; No shortness of breath  CARDIOVASCULAR: No chest pain or palpitations  GASTROINTESTINAL: No abdominal or epigastric pain. No nausea, vomiting, or hematemesis; No diarrhea or constipation. No melena or hematochezia.  GENITOURINARY: No dysuria, frequency or hematuria  NEUROLOGICAL: No numbness or weakness  SKIN: No itching, burning, rashes, or lesions   All other review of systems is negative unless indicated above.    MEDICATIONS:  MEDICATIONS  (STANDING):  amLODIPine   Tablet 10 milliGRAM(s) Oral daily  aspirin  chewable 81 milliGRAM(s) Oral daily  atorvastatin 80 milliGRAM(s) Oral at bedtime  cefepime   IVPB      cefepime   IVPB 1000 milliGRAM(s) IV Intermittent every 8 hours  dextrose 5%. 1000 milliLiter(s) (50 mL/Hr) IV Continuous <Continuous>  dextrose 50% Injectable 12.5 Gram(s) IV Push once  dextrose 50% Injectable 25 Gram(s) IV Push once  dextrose 50% Injectable 25 Gram(s) IV Push once  docusate sodium 100 milliGRAM(s) Oral three times a day  influenza   Vaccine 0.5 milliLiter(s) IntraMuscular once  insulin lispro (HumaLOG) corrective regimen sliding scale   SubCutaneous three times a day before meals  insulin lispro (HumaLOG) corrective regimen sliding scale   SubCutaneous at bedtime  lisinopril 40 milliGRAM(s) Oral daily  magnesium oxide 800 milliGRAM(s) Oral once  metoprolol succinate ER 25 milliGRAM(s) Oral daily  senna 2 Tablet(s) Oral at bedtime  sodium chloride 0.9% lock flush 3 milliLiter(s) IV Push every 8 hours  tamsulosin 0.4 milliGRAM(s) Oral at bedtime      PHYSICAL EXAM:  T(C): 36.8 (09-29-18 @ 21:10), Max: 37.1 (09-29-18 @ 04:52)  HR: 64 (09-29-18 @ 21:10) (58 - 76)  BP: 163/77 (09-29-18 @ 21:10) (148/75 - 163/-)  RR: 18 (09-29-18 @ 21:10) (18 - 18)  SpO2: 96% (09-29-18 @ 21:10) (94% - 96%)  Wt(kg): --  I&O's Summary    28 Sep 2018 07:01  -  29 Sep 2018 07:00  --------------------------------------------------------  IN: 740 mL / OUT: 0 mL / NET: 740 mL    29 Sep 2018 07:01  -  29 Sep 2018 21:21  --------------------------------------------------------  IN: 920 mL / OUT: 0 mL / NET: 920 mL          Appearance: Normal	  HEENT:   Normal oral mucosa, PERRL, EOMI	  Lymphatic: No lymphadenopathy , no edema  Cardiovascular: Normal S1 S2, No JVD, No murmurs , Peripheral pulses palpable 2+ bilaterally  Respiratory: +crackles 	  Gastrointestinal:  Soft, Non-tender, + BS	  Skin: No rashes, No ecchymoses, No cyanosis, warm to touch  Musculoskeletal: Normal range of motion, normal strength  Psychiatry:  Mood & affect appropriate  Ext: No edema      LABS:    CARDIAC MARKERS:                                11.8   6.33  )-----------( 242      ( 29 Sep 2018 08:03 )             34.8     09-29    142  |  103  |  17  ----------------------------<  144<H>  3.9   |  25  |  0.86    Ca    9.8      29 Sep 2018 06:30  Mg     1.6     09-29      proBNP:   Lipid Profile:   HgA1c:   TSH:     0          TELEMETRY: 	    ECG:  	  RADIOLOGY:   DIAGNOSTIC TESTING:  [ ] Echocardiogram:  [ ]  Catheterization:  [ ] Stress Test:    OTHER:

## 2018-09-29 NOTE — PROGRESS NOTE ADULT - ASSESSMENT
ASSESSMENT:    admitted with renal colic - resolved with conservative therapy    isolated fever - RVP (+) for rhinovirus - no evidence of UTI - chest CT with evidence of diffuse bronchial wall thickening and peribronchovascular ground glass nodular opacities suggestive of atypical pneumonia/bronchitis    hypoxemia - multifactorial  1) obesity with restrictive lung disease   2) likely severe underlying obstructive sleep apnea  3) atelectasis  4) small pleural effusions  5) rhinoviral infection without bronchospasm    HTN/HLD/DM/calcified coronary arteries on chest CT - r/o significant CAD    PLAN/RECOMMENDATIONS:    O2 PRN only  incentive spirometry  outpatient formal sleep study after discharge  weight reduction encouraged  continue cefepime pending results of cultures - ID f/u  no indication for pulmonary medications - bronchodilators if needed  diurese as tolerated by renal function and hemodynamics  cardiac meds: ASA/lipitor/norvasc/lisinopril/toprol XL - outpatient stress test  bowel regimen    Juan Balderas MD  Pulmonary Medicine  [phone]

## 2018-09-29 NOTE — PROGRESS NOTE ADULT - SUBJECTIVE AND OBJECTIVE BOX
Patient is a 68y old  Male who presents with a chief complaint of flank pain (28 Sep 2018 16:07)    Being followed by ID for Fever, Pneumonia,     Interval history:  Afebrile last 24 hours  No acute events      ROS:  cough, No SOB, CP  No N/V/D./abd pain  No other complaints      Antimicrobials:    cefepime   IVPB      cefepime   IVPB 1000 milliGRAM(s) IV Intermittent every 8 hours      Vital Signs Last 24 Hrs  T(C): 37.1 (09-29-18 @ 04:52), Max: 37.3 (09-28-18 @ 13:35)  T(F): 98.8 (09-29-18 @ 04:52), Max: 99.2 (09-28-18 @ 13:35)  HR: 76 (09-29-18 @ 04:52) (68 - 83)  BP: 148/75 (09-29-18 @ 04:52) (148/75 - 164/78)  BP(mean): --  RR: 18 (09-29-18 @ 04:52) (18 - 18)  SpO2: 95% (09-29-18 @ 04:52) (92% - 95%)    Physical Exam:    Constitutional NAD    HEENT EOMI,No pallor or icterus    No oral exudate or erythema    Neck supple no JVD or LN    Chest Bilateral rales    CVS No murmur or rub or gallop    Abd soft BS normal No tenderness no masses    Ext No cyanosis clubbing or edema    IV site no erythema tenderness or discharge    Joints no swelling or LOM    CNS AAO X 3 non focal    Lab Data:                          10.4   7.63  )-----------( 260      ( 28 Sep 2018 08:29 )             33.1       09-29    142  |  103  |  17  ----------------------------<  144<H>  3.9   |  25  |  0.86    Ca    9.8      29 Sep 2018 06:30  Mg     1.6     09-29      < from: CT Chest No Cont (09.27.18 @ 21:38) >  EXAM:  CT CHEST                            PROCEDURE DATE:  09/27/2018            INTERPRETATION:  CT CHEST WITHOUT CONTRAST    INDICATION: Shortness of breath, Rales auscultation bilaterally    TECHNIQUE: Unenhanced helical images were obtained ofthe chest. Coronal   and sagittal images were reconstructed. Maximum intensity projection   images were generated.     COMPARISON: No prior chest CT.    FINDINGS:     Lungs, pleura And Airways:     The airways are patent.  Diffuse bronchial wall thickening. No   bronchiectasis.    Peribronchovascular groundglass nodularities within the right upper and   lower lobes. Mild bronchocentric opacity within the basilar left lower   lobe. Multiple bilateral pleural effusions and subsegmental atelectasis   of the basilar lower lobes.    Mediastinum: There are no enlarged chest lymph nodes. The visualized   portion of the thyroid gland is unremarkable.       Heart and Vasculature: The heart is normal in size.  No pericardial   effusion..   Coronary artery disease with calcified plaque involving the right   coronary artery and left anterior descending artery. Mild aortic valve   leaflet calcifications..    The main pulmonary artery is not enlarged. No aortic aneurysm.    Upper Abdomen: Calcific atherosclerotic of the aorta and its branches.    Bones And Soft Tissues: The bones are unremarkable.  Bilateral   gynecomastia.    IMPRESSION:     1.  Bronchial wall thickening, right lung peribronchovascular   nodularities and mild bronchocentric opacity within the right lower lobe   possibly representing bronchopneumonia, most likely atypical infection   such as viral. Recommend follow-up in 4 weeks.    2.  Small bilateral pleural effusions.    3.  Coronary atherosclerosis.      .Blood Blood-Peripheral  09-28-18   No growth to date.  --  --      .Urine Clean Catch (Midstream)  09-25-18   <10,000 CFU/ml  Normal Urogenital andrés present  --  --    < from: TTE with Doppler (w/Cont) (09.27.18 @ 07:51) >  Patient name: RAY DUNCAN  YOB: 1950   Age: 68 (M)   MR#: [id number]  Study Date: 9/27/2018  Location: [address] [place], [place] [id number]: Марина Morrow JAMARI  Study quality: Technically difficult  Referring Physician: Nils Salazar MD  Blood Pressure: 149/65 mmHg  Height: 173 cm  Weight: 91 kg  BSA: 2 m2  ------------------------------------------------------------------------  PROCEDURE: Transthoracic echocardiogram with 2-D, M-Mode  and complete spectral and color flow Doppler. Verbal  consent was obtained for injection of  Ultrasonic Enhancing  Agent following a discussion of risks and benefits.  Following intravenous injection of Ultrasonic Enhancing  Agent , harmonic imaging was performed.  INDICATION: Essential (primary) hypertension (I10)  ------------------------------------------------------------------------  Dimensions:    Normal Values:  LA:     4.4    2.0 - 4.0 cm  Ao:     3.2    2.0 - 3.8 cm  SEPTUM: 1.1    0.6 - 1.2 cm  PWT:    1.0    0.6 - 1.1 cm  LVIDd: 4.5    3.0 - 5.6 cm  LVIDs:  2.6    1.8 - 4.0 cm  Derived variables:  LVMI: 80 g/m2  RWT: 0.44  Fractional short: 42 %  EF (Visual Estimate): 60-65 %  Doppler Peak Velocity (m/sec): AoV=1.3  ------------------------------------------------------------------------  Observations:  Mitral Valve: Normal mitral valve. Minimal mitral  regurgitation.  Aortic Valve/Aorta: Normal trileaflet aortic valve. Peak  transaortic valve gradient equals 7 mm Hg, mean transaortic  valve gradient equals 5 mm Hg, aortic valve velocity time  integral equals 30 cm.  Aortic Root: 3.2 cm.  Left Atrium: Normal left atrium.  Left Ventricle: Normal left ventricular systolic function.   Endocardial visualization enhanced with intravenous  injection of Ultrasonic Enhancing Agent (Definity). Normal  left ventricular internal dimensions and wall thicknesses.  Normal diastolic function  Right Heart: Normal right atrium. Normal right ventricular  size and function. Normal tricuspid valve. Minimal  tricuspid regurgitation.Normal pulmonic valve.  Pericardium/Pleura: Normal pericardium with no pericardial  effusion.  Hemodynamic: Estimated right atrial pressure is 8 mm Hg.  ------------------------------------------------------------------------  Conclusions:  1. Normalleft ventricular internal dimensions and wall  thicknesses.  2. Normal left ventricular systolic function.   Endocardial  visualization enhanced with intravenous injection of  Ultrasonic Enhancing Agent (Definity).  *** No previous Echo exam.    < end of copied text > Patient is a 68y old  Male who presents with a chief complaint of flank pain (28 Sep 2018 16:07)    Being followed by ID for Fever, Pneumonia,     Interval history:  Afebrile last 24 hours  No acute events      ROS:  cough, urine is clear  No SOB, CP  No N/V/D./abd pain  No other complaints      Antimicrobials:    cefepime   IVPB      cefepime   IVPB 1000 milliGRAM(s) IV Intermittent every 8 hours      Vital Signs Last 24 Hrs  T(C): 37.1 (09-29-18 @ 04:52), Max: 37.3 (09-28-18 @ 13:35)  T(F): 98.8 (09-29-18 @ 04:52), Max: 99.2 (09-28-18 @ 13:35)  HR: 76 (09-29-18 @ 04:52) (68 - 83)  BP: 148/75 (09-29-18 @ 04:52) (148/75 - 164/78)  BP(mean): --  RR: 18 (09-29-18 @ 04:52) (18 - 18)  SpO2: 95% (09-29-18 @ 04:52) (92% - 95%)    Physical Exam:    Constitutional NAD    HEENT EOMI,No pallor or icterus    No oral exudate or erythema    Neck supple no JVD or LN    Chest Bilateral rales    CVS No murmur or rub or gallop    Abd soft BS normal No tenderness no masses    Ext No cyanosis clubbing or edema    IV site no erythema tenderness or discharge    Joints no swelling or LOM    CNS AAO X 3 non focal    Lab Data:                          10.4   7.63  )-----------( 260      ( 28 Sep 2018 08:29 )             33.1       09-29    142  |  103  |  17  ----------------------------<  144<H>  3.9   |  25  |  0.86    Ca    9.8      29 Sep 2018 06:30  Mg     1.6     09-29      < from: CT Chest No Cont (09.27.18 @ 21:38) >  EXAM:  CT CHEST                            PROCEDURE DATE:  09/27/2018            INTERPRETATION:  CT CHEST WITHOUT CONTRAST    INDICATION: Shortness of breath, Rales auscultation bilaterally    TECHNIQUE: Unenhanced helical images were obtained ofthe chest. Coronal   and sagittal images were reconstructed. Maximum intensity projection   images were generated.     COMPARISON: No prior chest CT.    FINDINGS:     Lungs, pleura And Airways:     The airways are patent.  Diffuse bronchial wall thickening. No   bronchiectasis.    Peribronchovascular groundglass nodularities within the right upper and   lower lobes. Mild bronchocentric opacity within the basilar left lower   lobe. Multiple bilateral pleural effusions and subsegmental atelectasis   of the basilar lower lobes.    Mediastinum: There are no enlarged chest lymph nodes. The visualized   portion of the thyroid gland is unremarkable.       Heart and Vasculature: The heart is normal in size.  No pericardial   effusion..   Coronary artery disease with calcified plaque involving the right   coronary artery and left anterior descending artery. Mild aortic valve   leaflet calcifications..    The main pulmonary artery is not enlarged. No aortic aneurysm.    Upper Abdomen: Calcific atherosclerotic of the aorta and its branches.    Bones And Soft Tissues: The bones are unremarkable.  Bilateral   gynecomastia.    IMPRESSION:     1.  Bronchial wall thickening, right lung peribronchovascular   nodularities and mild bronchocentric opacity within the right lower lobe   possibly representing bronchopneumonia, most likely atypical infection   such as viral. Recommend follow-up in 4 weeks.    2.  Small bilateral pleural effusions.    3.  Coronary atherosclerosis.      .Blood Blood-Peripheral  09-28-18   No growth to date.  --  --      .Urine Clean Catch (Midstream)  09-25-18   <10,000 CFU/ml  Normal Urogenital andrés present  --  --    < from: TTE with Doppler (w/Cont) (09.27.18 @ 07:51) >  Patient name: RAY DUNCAN  YOB: 1950   Age: 68 (M)   MR#: [id number]  Study Date: 9/27/2018  Location: [address] [place], [place] [id number]: Марина Morrow JAMARI  Study quality: Technically difficult  Referring Physician: Nils Salazar MD  Blood Pressure: 149/65 mmHg  Height: 173 cm  Weight: 91 kg  BSA: 2 m2  ------------------------------------------------------------------------  PROCEDURE: Transthoracic echocardiogram with 2-D, M-Mode  and complete spectral and color flow Doppler. Verbal  consent was obtained for injection of  Ultrasonic Enhancing  Agent following a discussion of risks and benefits.  Following intravenous injection of Ultrasonic Enhancing  Agent , harmonic imaging was performed.  INDICATION: Essential (primary) hypertension (I10)  ------------------------------------------------------------------------  Dimensions:    Normal Values:  LA:     4.4    2.0 - 4.0 cm  Ao:     3.2    2.0 - 3.8 cm  SEPTUM: 1.1    0.6 - 1.2 cm  PWT:    1.0    0.6 - 1.1 cm  LVIDd: 4.5    3.0 - 5.6 cm  LVIDs:  2.6    1.8 - 4.0 cm  Derived variables:  LVMI: 80 g/m2  RWT: 0.44  Fractional short: 42 %  EF (Visual Estimate): 60-65 %  Doppler Peak Velocity (m/sec): AoV=1.3  ------------------------------------------------------------------------  Observations:  Mitral Valve: Normal mitral valve. Minimal mitral  regurgitation.  Aortic Valve/Aorta: Normal trileaflet aortic valve. Peak  transaortic valve gradient equals 7 mm Hg, mean transaortic  valve gradient equals 5 mm Hg, aortic valve velocity time  integral equals 30 cm.  Aortic Root: 3.2 cm.  Left Atrium: Normal left atrium.  Left Ventricle: Normal left ventricular systolic function.   Endocardial visualization enhanced with intravenous  injection of Ultrasonic Enhancing Agent (Definity). Normal  left ventricular internal dimensions and wall thicknesses.  Normal diastolic function  Right Heart: Normal right atrium. Normal right ventricular  size and function. Normal tricuspid valve. Minimal  tricuspid regurgitation.Normal pulmonic valve.  Pericardium/Pleura: Normal pericardium with no pericardial  effusion.  Hemodynamic: Estimated right atrial pressure is 8 mm Hg.  ------------------------------------------------------------------------  Conclusions:  1. Normalleft ventricular internal dimensions and wall  thicknesses.  2. Normal left ventricular systolic function.   Endocardial  visualization enhanced with intravenous injection of  Ultrasonic Enhancing Agent (Definity).  *** No previous Echo exam.    < end of copied text >

## 2018-09-29 NOTE — PROGRESS NOTE ADULT - SUBJECTIVE AND OBJECTIVE BOX
Follow-up Pulm Progress Note    The patient was seen and examined. Notes reviewed and discussed with staff/team as applicable      No new respiratory events overnight. Feels ok. Mild cough. ID note reviewed    Denies: increased SOB, Chest pain, increased cough, colored phlegm, hemoptysis, N/V/D, neck stiffness, dysuria      Vital Signs Last 24 Hrs  T(C): 37.1 (29 Sep 2018 04:52), Max: 37.3 (28 Sep 2018 13:35)  T(F): 98.8 (29 Sep 2018 04:52), Max: 99.2 (28 Sep 2018 13:35)  HR: 76 (29 Sep 2018 04:52) (68 - 76)  BP: 148/75 (29 Sep 2018 04:52) (148/75 - 164/78)  BP(mean): --  RR: 18 (29 Sep 2018 04:52) (18 - 18)  SpO2: 95% (29 Sep 2018 04:52) (92% - 95%)           @ 07:01  -   @ 07:00  --------------------------------------------------------  IN: 740 mL / OUT: 0 mL / NET: 740 mL          Medications:  MEDICATIONS  (STANDING):  amLODIPine   Tablet 10 milliGRAM(s) Oral daily  aspirin  chewable 81 milliGRAM(s) Oral daily  atorvastatin 80 milliGRAM(s) Oral at bedtime  cefepime   IVPB      cefepime   IVPB 1000 milliGRAM(s) IV Intermittent every 8 hours  dextrose 5%. 1000 milliLiter(s) (50 mL/Hr) IV Continuous <Continuous>  dextrose 50% Injectable 12.5 Gram(s) IV Push once  dextrose 50% Injectable 25 Gram(s) IV Push once  dextrose 50% Injectable 25 Gram(s) IV Push once  docusate sodium 100 milliGRAM(s) Oral three times a day  influenza   Vaccine 0.5 milliLiter(s) IntraMuscular once  insulin lispro (HumaLOG) corrective regimen sliding scale   SubCutaneous three times a day before meals  insulin lispro (HumaLOG) corrective regimen sliding scale   SubCutaneous at bedtime  lisinopril 40 milliGRAM(s) Oral daily  metoprolol succinate ER 25 milliGRAM(s) Oral daily  senna 2 Tablet(s) Oral at bedtime  sodium chloride 0.9% lock flush 3 milliLiter(s) IV Push every 8 hours  tamsulosin 0.4 milliGRAM(s) Oral at bedtime    MEDICATIONS  (PRN):  acetaminophen   Tablet .. 650 milliGRAM(s) Oral every 6 hours PRN Temp greater or equal to 38.5C (101.3F), Mild Pain (1 - 3)  ALBUTerol/ipratropium for Nebulization 3 milliLiter(s) Nebulizer every 6 hours PRN Shortness of Breath and/or Wheezing  dextrose 40% Gel 15 Gram(s) Oral once PRN Blood Glucose LESS THAN 70 milliGRAM(s)/deciliter  glucagon  Injectable 1 milliGRAM(s) IntraMuscular once PRN Glucose LESS THAN 70 milligrams/deciliter  morphine  - Injectable 2 milliGRAM(s) IV Push every 4 hours PRN Severe Pain (7 - 10)  oxyCODONE    5 mG/acetaminophen 325 mG 1 Tablet(s) Oral every 4 hours PRN Moderate Pain (4 - 6)      Allergies    No Known Allergies    Physical Examination:    Pleasant white man, NAD  Neck: no JVD, LAD, accessory muscle use  PULM: Few rales R base. No wheezes  CVS: Regular rate and rhythm, S1S2, no murmurs, rubs, or gallops  Abdomen: soft, obese  Extremities: no CCE  Neuro: non focal      LABS:                        11.8   6.33  )-----------( 242      ( 29 Sep 2018 08:03 )             34.8         142  |  103  |  17  ----------------------------<  144<H>  3.9   |  25  |  0.86    Ca    9.8      29 Sep 2018 06:30  Mg     1.6                 CAPILLARY BLOOD GLUCOSE      POCT Blood Glucose.: 163 mg/dL (29 Sep 2018 07:59)      Urinalysis Basic - ( 28 Sep 2018 09:47 )    Color: Yellow / Appearance: Clear / S.011 / pH: x  Gluc: x / Ketone: Negative  / Bili: Negative / Urobili: Negative   Blood: x / Protein: 100 mg/dL / Nitrite: Negative   Leuk Esterase: Negative / RBC: 1 /hpf / WBC x   Sq Epi: x / Non Sq Epi: 0 /hpf / Bacteria: Negative            CULTURES:  Culture Results:   No growth to date. ( @ :41)  Culture Results:   No growth to date. (:)  Culture Results:   <10,000 CFU/ml  Normal Urogenital andrés present ( @ 03:23)    Most recent blood culture -- :41   -- -- .Blood Blood-Peripheral :41  Most recent blood culture --  03:23   -- -- .Urine Clean Catch (Midstream)  03:23

## 2018-09-30 LAB
ANION GAP SERPL CALC-SCNC: 10 MMOL/L — SIGNIFICANT CHANGE UP (ref 5–17)
BUN SERPL-MCNC: 21 MG/DL — SIGNIFICANT CHANGE UP (ref 7–23)
CALCIUM SERPL-MCNC: 9.4 MG/DL — SIGNIFICANT CHANGE UP (ref 8.4–10.5)
CHLORIDE SERPL-SCNC: 103 MMOL/L — SIGNIFICANT CHANGE UP (ref 96–108)
CO2 SERPL-SCNC: 27 MMOL/L — SIGNIFICANT CHANGE UP (ref 22–31)
CREAT SERPL-MCNC: 0.84 MG/DL — SIGNIFICANT CHANGE UP (ref 0.5–1.3)
GLUCOSE BLDC GLUCOMTR-MCNC: 157 MG/DL — HIGH (ref 70–99)
GLUCOSE BLDC GLUCOMTR-MCNC: 170 MG/DL — HIGH (ref 70–99)
GLUCOSE BLDC GLUCOMTR-MCNC: 189 MG/DL — HIGH (ref 70–99)
GLUCOSE SERPL-MCNC: 146 MG/DL — HIGH (ref 70–99)
HCT VFR BLD CALC: 32.6 % — LOW (ref 39–50)
HGB BLD-MCNC: 10.9 G/DL — LOW (ref 13–17)
MAGNESIUM SERPL-MCNC: 1.7 MG/DL — SIGNIFICANT CHANGE UP (ref 1.6–2.6)
MCHC RBC-ENTMCNC: 30.3 PG — SIGNIFICANT CHANGE UP (ref 27–34)
MCHC RBC-ENTMCNC: 33.4 GM/DL — SIGNIFICANT CHANGE UP (ref 32–36)
MCV RBC AUTO: 90.6 FL — SIGNIFICANT CHANGE UP (ref 80–100)
PLATELET # BLD AUTO: 252 K/UL — SIGNIFICANT CHANGE UP (ref 150–400)
POTASSIUM SERPL-MCNC: 4 MMOL/L — SIGNIFICANT CHANGE UP (ref 3.5–5.3)
POTASSIUM SERPL-SCNC: 4 MMOL/L — SIGNIFICANT CHANGE UP (ref 3.5–5.3)
RBC # BLD: 3.6 M/UL — LOW (ref 4.2–5.8)
RBC # FLD: 13.3 % — SIGNIFICANT CHANGE UP (ref 10.3–14.5)
SODIUM SERPL-SCNC: 140 MMOL/L — SIGNIFICANT CHANGE UP (ref 135–145)
WBC # BLD: 6.58 K/UL — SIGNIFICANT CHANGE UP (ref 3.8–10.5)
WBC # FLD AUTO: 6.58 K/UL — SIGNIFICANT CHANGE UP (ref 3.8–10.5)

## 2018-09-30 RX ADMIN — LISINOPRIL 40 MILLIGRAM(S): 2.5 TABLET ORAL at 05:30

## 2018-09-30 RX ADMIN — Medication 2: at 17:31

## 2018-09-30 RX ADMIN — SODIUM CHLORIDE 3 MILLILITER(S): 9 INJECTION INTRAMUSCULAR; INTRAVENOUS; SUBCUTANEOUS at 14:35

## 2018-09-30 RX ADMIN — Medication 81 MILLIGRAM(S): at 11:41

## 2018-09-30 RX ADMIN — AMLODIPINE BESYLATE 10 MILLIGRAM(S): 2.5 TABLET ORAL at 05:31

## 2018-09-30 RX ADMIN — CEFEPIME 100 MILLIGRAM(S): 1 INJECTION, POWDER, FOR SOLUTION INTRAMUSCULAR; INTRAVENOUS at 22:36

## 2018-09-30 RX ADMIN — Medication 100 MILLIGRAM(S): at 22:15

## 2018-09-30 RX ADMIN — CEFEPIME 100 MILLIGRAM(S): 1 INJECTION, POWDER, FOR SOLUTION INTRAMUSCULAR; INTRAVENOUS at 05:31

## 2018-09-30 RX ADMIN — Medication 2: at 08:06

## 2018-09-30 RX ADMIN — SODIUM CHLORIDE 3 MILLILITER(S): 9 INJECTION INTRAMUSCULAR; INTRAVENOUS; SUBCUTANEOUS at 05:37

## 2018-09-30 RX ADMIN — SENNA PLUS 2 TABLET(S): 8.6 TABLET ORAL at 22:15

## 2018-09-30 RX ADMIN — Medication 2: at 12:38

## 2018-09-30 RX ADMIN — SODIUM CHLORIDE 3 MILLILITER(S): 9 INJECTION INTRAMUSCULAR; INTRAVENOUS; SUBCUTANEOUS at 22:09

## 2018-09-30 RX ADMIN — Medication 3 MILLILITER(S): at 08:06

## 2018-09-30 RX ADMIN — Medication 100 MILLIGRAM(S): at 14:35

## 2018-09-30 RX ADMIN — TAMSULOSIN HYDROCHLORIDE 0.4 MILLIGRAM(S): 0.4 CAPSULE ORAL at 22:15

## 2018-09-30 RX ADMIN — Medication 25 MILLIGRAM(S): at 05:30

## 2018-09-30 RX ADMIN — Medication 100 MILLIGRAM(S): at 05:29

## 2018-09-30 RX ADMIN — ATORVASTATIN CALCIUM 80 MILLIGRAM(S): 80 TABLET, FILM COATED ORAL at 22:15

## 2018-09-30 RX ADMIN — MAGNESIUM OXIDE 400 MG ORAL TABLET 800 MILLIGRAM(S): 241.3 TABLET ORAL at 05:29

## 2018-09-30 RX ADMIN — CEFEPIME 100 MILLIGRAM(S): 1 INJECTION, POWDER, FOR SOLUTION INTRAMUSCULAR; INTRAVENOUS at 14:35

## 2018-09-30 NOTE — PROGRESS NOTE ADULT - PROBLEM SELECTOR PLAN 2
Awaiting SPECT   ASA
Outpatient SPECT   ASA
Outpatient SPECT and TTE   ASA

## 2018-09-30 NOTE — PROGRESS NOTE ADULT - PROBLEM SELECTOR PLAN 5
Stable   pain control

## 2018-09-30 NOTE — PROGRESS NOTE ADULT - ASSESSMENT
68 m with  L kidney stone, Renal colic - Stone in bladder. Urology follow.  DC IVF. Fluid load- diurese  Diabetes  - BS control  Hypercholesteremia  - continue meds  Hypertension -continue meds  Coronary calcifications on CT- r/o CAD Cardiology evaluation noted.. Stress test pending Cardiology follow noted.   Continue antibiotics, ID and Pulmonary evaluation noted.  Viral syndrome- supportive care   d/w patient  QA  DCP home  Nils Salazar MD pager [phone]

## 2018-09-30 NOTE — PROGRESS NOTE ADULT - SUBJECTIVE AND OBJECTIVE BOX
Subjective: Patient seen and examined. No new events except as noted.   Resting comfortably in bed     REVIEW OF SYSTEMS:    CONSTITUTIONAL: + weakness, fevers or chills  EYES/ENT: No visual changes;  No vertigo or throat pain   NECK: No pain or stiffness  RESPIRATORY: No cough, wheezing, hemoptysis; No shortness of breath  CARDIOVASCULAR: No chest pain or palpitations  GASTROINTESTINAL: No abdominal or epigastric pain. No nausea, vomiting, or hematemesis; No diarrhea or constipation. No melena or hematochezia.  GENITOURINARY: No dysuria, frequency or hematuria  NEUROLOGICAL: No numbness or weakness  SKIN: No itching, burning, rashes, or lesions   All other review of systems is negative unless indicated above.    MEDICATIONS:  MEDICATIONS  (STANDING):  amLODIPine   Tablet 10 milliGRAM(s) Oral daily  aspirin  chewable 81 milliGRAM(s) Oral daily  atorvastatin 80 milliGRAM(s) Oral at bedtime  cefepime   IVPB      cefepime   IVPB 1000 milliGRAM(s) IV Intermittent every 8 hours  dextrose 5%. 1000 milliLiter(s) (50 mL/Hr) IV Continuous <Continuous>  dextrose 50% Injectable 12.5 Gram(s) IV Push once  dextrose 50% Injectable 25 Gram(s) IV Push once  dextrose 50% Injectable 25 Gram(s) IV Push once  docusate sodium 100 milliGRAM(s) Oral three times a day  influenza   Vaccine 0.5 milliLiter(s) IntraMuscular once  insulin lispro (HumaLOG) corrective regimen sliding scale   SubCutaneous three times a day before meals  insulin lispro (HumaLOG) corrective regimen sliding scale   SubCutaneous at bedtime  lisinopril 40 milliGRAM(s) Oral daily  metoprolol succinate ER 25 milliGRAM(s) Oral daily  senna 2 Tablet(s) Oral at bedtime  sodium chloride 0.9% lock flush 3 milliLiter(s) IV Push every 8 hours  tamsulosin 0.4 milliGRAM(s) Oral at bedtime      PHYSICAL EXAM:  T(C): 36.6 (09-30-18 @ 05:37), Max: 37 (09-29-18 @ 14:44)  HR: 68 (09-30-18 @ 05:37) (58 - 76)  BP: 159/75 (09-30-18 @ 05:37) (157/74 - 163/-)  RR: 18 (09-30-18 @ 05:37) (18 - 18)  SpO2: 97% (09-30-18 @ 05:37) (94% - 97%)  Wt(kg): --  I&O's Summary    29 Sep 2018 07:01  -  30 Sep 2018 07:00  --------------------------------------------------------  IN: 920 mL / OUT: 0 mL / NET: 920 mL          Appearance: NAD	  HEENT:   Normal oral mucosa, PERRL, EOMI	  Lymphatic: No lymphadenopathy , no edema  Cardiovascular: Normal S1 S2, No JVD, No murmurs , Peripheral pulses palpable 2+ bilaterally  Respiratory: Lungs clear to auscultation, normal effort 	  Gastrointestinal:  Soft, Non-tender, + BS	  Skin: No rashes, No ecchymoses, No cyanosis, warm to touch  Musculoskeletal: Normal range of motion, normal strength  Psychiatry:  Mood & affect appropriate  Ext: No edema      LABS:    CARDIAC MARKERS:                                10.9   6.58  )-----------( 252      ( 30 Sep 2018 08:19 )             32.6     09-30    140  |  103  |  21  ----------------------------<  146<H>  4.0   |  27  |  0.84    Ca    9.4      30 Sep 2018 06:54  Mg     1.7     09-30      proBNP:   Lipid Profile:   HgA1c:   TSH:     0          TELEMETRY: 	    ECG:  	  RADIOLOGY:   DIAGNOSTIC TESTING:  [ ] Echocardiogram:  [ ]  Catheterization:  [ ] Stress Test:    OTHER:

## 2018-09-30 NOTE — PROGRESS NOTE ADULT - PROBLEM SELECTOR PROBLEM 2
CAD (coronary artery disease)
Renal colic
Renal colic
CAD (coronary artery disease)

## 2018-09-30 NOTE — PROGRESS NOTE ADULT - SUBJECTIVE AND OBJECTIVE BOX
Follow-up Pulm Progress Note    The patient was seen and examined. Notes reviewed and discussed with staff/team as applicable      No new respiratory events overnight. Feels ok.     Denies: SOB, Chest pain, increased cough, colored phlegm, hemoptysis, N/V/D, neck stiffness, dysuria      Vital Signs Last 24 Hrs  T(C): 36.6 (30 Sep 2018 05:37), Max: 37 (29 Sep 2018 14:44)  T(F): 97.8 (30 Sep 2018 05:37), Max: 98.6 (29 Sep 2018 14:44)  HR: 68 (30 Sep 2018 05:37) (58 - 76)  BP: 159/75 (30 Sep 2018 05:37) (154/76 - 163/-)  BP(mean): 72 (29 Sep 2018 14:44) (72 - 72)  RR: 18 (30 Sep 2018 05:37) (18 - 18)  SpO2: 97% (30 Sep 2018 05:37) (94% - 97%)           @ 07:01  -   @ 07:00  --------------------------------------------------------  IN: 920 mL / OUT: 0 mL / NET: 920 mL          Medications:  MEDICATIONS  (STANDING):  amLODIPine   Tablet 10 milliGRAM(s) Oral daily  aspirin  chewable 81 milliGRAM(s) Oral daily  atorvastatin 80 milliGRAM(s) Oral at bedtime  cefepime   IVPB      cefepime   IVPB 1000 milliGRAM(s) IV Intermittent every 8 hours  dextrose 5%. 1000 milliLiter(s) (50 mL/Hr) IV Continuous <Continuous>  dextrose 50% Injectable 12.5 Gram(s) IV Push once  dextrose 50% Injectable 25 Gram(s) IV Push once  dextrose 50% Injectable 25 Gram(s) IV Push once  docusate sodium 100 milliGRAM(s) Oral three times a day  influenza   Vaccine 0.5 milliLiter(s) IntraMuscular once  insulin lispro (HumaLOG) corrective regimen sliding scale   SubCutaneous three times a day before meals  insulin lispro (HumaLOG) corrective regimen sliding scale   SubCutaneous at bedtime  lisinopril 40 milliGRAM(s) Oral daily  metoprolol succinate ER 25 milliGRAM(s) Oral daily  senna 2 Tablet(s) Oral at bedtime  sodium chloride 0.9% lock flush 3 milliLiter(s) IV Push every 8 hours  tamsulosin 0.4 milliGRAM(s) Oral at bedtime    MEDICATIONS  (PRN):  acetaminophen   Tablet .. 650 milliGRAM(s) Oral every 6 hours PRN Temp greater or equal to 38.5C (101.3F), Mild Pain (1 - 3)  ALBUTerol/ipratropium for Nebulization 3 milliLiter(s) Nebulizer every 6 hours PRN Shortness of Breath and/or Wheezing  dextrose 40% Gel 15 Gram(s) Oral once PRN Blood Glucose LESS THAN 70 milliGRAM(s)/deciliter  glucagon  Injectable 1 milliGRAM(s) IntraMuscular once PRN Glucose LESS THAN 70 milligrams/deciliter  morphine  - Injectable 2 milliGRAM(s) IV Push every 4 hours PRN Severe Pain (7 - 10)  oxyCODONE    5 mG/acetaminophen 325 mG 1 Tablet(s) Oral every 4 hours PRN Moderate Pain (4 - 6)      Allergies    No Known Allergies      Physical Examination:    Pleasant man, obese, NAD  Neck: no JVD, LAD, accessory muscle use  PULM: Clear to auscultation bilaterally, no wheezes, rales, rhonchi  CVS: Regular rate and rhythm, S1S2, no murmurs, rubs, or gallops  Abdomen: soft, NT  Extremities: no LE edema  Neuro: non focal      LABS:                        10.9   6.58  )-----------( 252      ( 30 Sep 2018 08:19 )             32.6         140  |  103  |  21  ----------------------------<  146<H>  4.0   |  27  |  0.84    Ca    9.4      30 Sep 2018 06:54  Mg     1.7                 CAPILLARY BLOOD GLUCOSE      POCT Blood Glucose.: 157 mg/dL (30 Sep 2018 07:55)      Urinalysis Basic - ( 28 Sep 2018 09:47 )    Color: Yellow / Appearance: Clear / S.011 / pH: x  Gluc: x / Ketone: Negative  / Bili: Negative / Urobili: Negative   Blood: x / Protein: 100 mg/dL / Nitrite: Negative   Leuk Esterase: Negative / RBC: 1 /hpf / WBC x   Sq Epi: x / Non Sq Epi: 0 /hpf / Bacteria: Negative            CULTURES:  Culture Results:   No growth to date. ( @ :41)  Culture Results:   No growth to date. ( @ :)  Culture Results:   <10,000 CFU/ml  Normal Urogenital andrés present ( @ 03:23)    Most recent blood culture -- 41   -- -- .Blood Blood-Peripheral  @ :41

## 2018-09-30 NOTE — PROGRESS NOTE ADULT - ASSESSMENT
ASSESSMENT:    admitted with renal colic - resolved with conservative therapy    isolated fever - RVP (+) for rhinovirus - no evidence of UTI - chest CT with evidence of diffuse bronchial wall thickening and peribronchovascular ground glass nodular opacities suggestive of atypical pneumonia/bronchitis    hypoxemia - multifactorial  1) obesity with restrictive lung disease   2) likely severe underlying obstructive sleep apnea  3) atelectasis  4) small pleural effusions  5) rhinoviral infection without bronchospasm        PLAN/RECOMMENDATIONS:    O2 PRN only  incentive spirometry  outpatient formal sleep study after discharge  weight reduction encouraged  continue cefepime pending results of cultures - ID f/u. If we can change to PO ceftin or cefdinir, he could finish course as outpt.  no indication for pulmonary medications - bronchodilators if needed  diurese as tolerated by renal function and hemodynamics  cardiac meds: ASA/lipitor/norvasc/lisinopril/toprol XL - outpatient stress test  bowel regimen        Juan Balderas MD  Pulmonary Medicine  [phone]

## 2018-09-30 NOTE — PROGRESS NOTE ADULT - PROBLEM SELECTOR PLAN 1
-Pt reports significant improvement in his pain symptoms. Potential that pt passed stone even though he did not see stone in strainer  -Would order renal US and KUB today to assess stone passage. If resolution of hydronephrosis likely passed stone  -Aggressive Hydration  -Flomax 0.4mg qday  -Monitor pain, vitals  -Continue strainer
-Renal US demonstrates resolution of hydronephrosis. No radio-opaque stone seen on KUB  -Pt likely passed ureteral stone  -Ok for discharge from urology perspective. No need for flomax.  -Pt to follow-up as urology outpatient for stone management and prevention  -Thank you for the consult. Please call with questions
clinically and symptomatically stable   Pain controlled   Urology following

## 2018-09-30 NOTE — PROGRESS NOTE ADULT - SUBJECTIVE AND OBJECTIVE BOX
Patient is a 68y old  Male who presents with a chief complaint of flank pain (30 Sep 2018 11:08)      SUBJECTIVE / OVERNIGHT EVENTS: No new complaints.   Review of Systems  chest pain no  palpitations no  sob no  nausea no  headache no    MEDICATIONS  (STANDING):  amLODIPine   Tablet 10 milliGRAM(s) Oral daily  aspirin  chewable 81 milliGRAM(s) Oral daily  atorvastatin 80 milliGRAM(s) Oral at bedtime  cefepime   IVPB      cefepime   IVPB 1000 milliGRAM(s) IV Intermittent every 8 hours  dextrose 5%. 1000 milliLiter(s) (50 mL/Hr) IV Continuous <Continuous>  dextrose 50% Injectable 12.5 Gram(s) IV Push once  dextrose 50% Injectable 25 Gram(s) IV Push once  dextrose 50% Injectable 25 Gram(s) IV Push once  docusate sodium 100 milliGRAM(s) Oral three times a day  influenza   Vaccine 0.5 milliLiter(s) IntraMuscular once  insulin lispro (HumaLOG) corrective regimen sliding scale   SubCutaneous three times a day before meals  insulin lispro (HumaLOG) corrective regimen sliding scale   SubCutaneous at bedtime  lisinopril 40 milliGRAM(s) Oral daily  metoprolol succinate ER 25 milliGRAM(s) Oral daily  senna 2 Tablet(s) Oral at bedtime  sodium chloride 0.9% lock flush 3 milliLiter(s) IV Push every 8 hours  tamsulosin 0.4 milliGRAM(s) Oral at bedtime    MEDICATIONS  (PRN):  acetaminophen   Tablet .. 650 milliGRAM(s) Oral every 6 hours PRN Temp greater or equal to 38.5C (101.3F), Mild Pain (1 - 3)  ALBUTerol/ipratropium for Nebulization 3 milliLiter(s) Nebulizer every 6 hours PRN Shortness of Breath and/or Wheezing  dextrose 40% Gel 15 Gram(s) Oral once PRN Blood Glucose LESS THAN 70 milliGRAM(s)/deciliter  glucagon  Injectable 1 milliGRAM(s) IntraMuscular once PRN Glucose LESS THAN 70 milligrams/deciliter  morphine  - Injectable 2 milliGRAM(s) IV Push every 4 hours PRN Severe Pain (7 - 10)  oxyCODONE    5 mG/acetaminophen 325 mG 1 Tablet(s) Oral every 4 hours PRN Moderate Pain (4 - 6)      Vital Signs Last 24 Hrs  T(C): 36.9 (30 Sep 2018 14:22), Max: 36.9 (29 Sep 2018 18:05)  T(F): 98.5 (30 Sep 2018 14:22), Max: 98.5 (30 Sep 2018 14:22)  HR: 67 (30 Sep 2018 14:22) (64 - 76)  BP: 161/79 (30 Sep 2018 14:22) (157/74 - 163/77)  BP(mean): --  RR: 18 (30 Sep 2018 14:22) (18 - 18)  SpO2: 96% (30 Sep 2018 14:22) (95% - 97%)    PHYSICAL EXAM:  GENERAL: NAD, well-developed  HEAD:  Atraumatic, Normocephalic  EYES: EOMI, PERRLA, conjunctiva and sclera clear  NECK: Supple, No JVD  CHEST/LUNG: Clear to auscultation bilaterally; No wheeze  HEART: Regular rate and rhythm; No murmurs, rubs, or gallops  ABDOMEN: Soft, Nontender, Nondistended; Bowel sounds present  EXTREMITIES:  2+ Peripheral Pulses, No clubbing, cyanosis, or edema  PSYCH: AAOx3  NEUROLOGY: non-focal  SKIN: No rashes or lesions    LABS:                        10.9   6.58  )-----------( 252      ( 30 Sep 2018 08:19 )             32.6     09-30    140  |  103  |  21  ----------------------------<  146<H>  4.0   |  27  |  0.84    Ca    9.4      30 Sep 2018 06:54  Mg     1.7     09-30                Culture - Blood (collected 28 Sep 2018 00:41)  Source: .Blood Blood-Peripheral  Preliminary Report (29 Sep 2018 01:01):    No growth to date.    Culture - Blood (collected 28 Sep 2018 00:41)  Source: .Blood Blood-Peripheral  Preliminary Report (29 Sep 2018 01:01):    No growth to date.        RADIOLOGY & ADDITIONAL TESTS:    Imaging Personally Reviewed:    Consultant(s) Notes Reviewed:      Care Discussed with Consultants/Other Providers:

## 2018-10-01 VITALS
TEMPERATURE: 98 F | DIASTOLIC BLOOD PRESSURE: 76 MMHG | SYSTOLIC BLOOD PRESSURE: 151 MMHG | HEART RATE: 78 BPM | OXYGEN SATURATION: 96 % | RESPIRATION RATE: 18 BRPM

## 2018-10-01 LAB
ANION GAP SERPL CALC-SCNC: 9 MMOL/L — SIGNIFICANT CHANGE UP (ref 5–17)
BASOPHILS # BLD AUTO: 0.01 K/UL — SIGNIFICANT CHANGE UP (ref 0–0.2)
BASOPHILS NFR BLD AUTO: 0.1 % — SIGNIFICANT CHANGE UP (ref 0–2)
BUN SERPL-MCNC: 17 MG/DL — SIGNIFICANT CHANGE UP (ref 7–23)
CALCIUM SERPL-MCNC: 9.6 MG/DL — SIGNIFICANT CHANGE UP (ref 8.4–10.5)
CHLORIDE SERPL-SCNC: 101 MMOL/L — SIGNIFICANT CHANGE UP (ref 96–108)
CO2 SERPL-SCNC: 28 MMOL/L — SIGNIFICANT CHANGE UP (ref 22–31)
CREAT SERPL-MCNC: 0.77 MG/DL — SIGNIFICANT CHANGE UP (ref 0.5–1.3)
EOSINOPHIL # BLD AUTO: 0.11 K/UL — SIGNIFICANT CHANGE UP (ref 0–0.5)
EOSINOPHIL NFR BLD AUTO: 1.6 % — SIGNIFICANT CHANGE UP (ref 0–6)
GLUCOSE BLDC GLUCOMTR-MCNC: 159 MG/DL — HIGH (ref 70–99)
GLUCOSE BLDC GLUCOMTR-MCNC: 169 MG/DL — HIGH (ref 70–99)
GLUCOSE BLDC GLUCOMTR-MCNC: 170 MG/DL — HIGH (ref 70–99)
GLUCOSE BLDC GLUCOMTR-MCNC: 173 MG/DL — HIGH (ref 70–99)
GLUCOSE SERPL-MCNC: 162 MG/DL — HIGH (ref 70–99)
HCT VFR BLD CALC: 32.4 % — LOW (ref 39–50)
HGB BLD-MCNC: 10.8 G/DL — LOW (ref 13–17)
IMM GRANULOCYTES NFR BLD AUTO: 0.7 % — SIGNIFICANT CHANGE UP (ref 0–1.5)
LYMPHOCYTES # BLD AUTO: 1.69 K/UL — SIGNIFICANT CHANGE UP (ref 1–3.3)
LYMPHOCYTES # BLD AUTO: 23.9 % — SIGNIFICANT CHANGE UP (ref 13–44)
MCHC RBC-ENTMCNC: 30.3 PG — SIGNIFICANT CHANGE UP (ref 27–34)
MCHC RBC-ENTMCNC: 33.3 GM/DL — SIGNIFICANT CHANGE UP (ref 32–36)
MCV RBC AUTO: 90.8 FL — SIGNIFICANT CHANGE UP (ref 80–100)
MONOCYTES # BLD AUTO: 0.52 K/UL — SIGNIFICANT CHANGE UP (ref 0–0.9)
MONOCYTES NFR BLD AUTO: 7.3 % — SIGNIFICANT CHANGE UP (ref 2–14)
NEUTROPHILS # BLD AUTO: 4.7 K/UL — SIGNIFICANT CHANGE UP (ref 1.8–7.4)
NEUTROPHILS NFR BLD AUTO: 66.4 % — SIGNIFICANT CHANGE UP (ref 43–77)
PLATELET # BLD AUTO: 278 K/UL — SIGNIFICANT CHANGE UP (ref 150–400)
POTASSIUM SERPL-MCNC: 4.1 MMOL/L — SIGNIFICANT CHANGE UP (ref 3.5–5.3)
POTASSIUM SERPL-SCNC: 4.1 MMOL/L — SIGNIFICANT CHANGE UP (ref 3.5–5.3)
RBC # BLD: 3.57 M/UL — LOW (ref 4.2–5.8)
RBC # FLD: 13.3 % — SIGNIFICANT CHANGE UP (ref 10.3–14.5)
SODIUM SERPL-SCNC: 138 MMOL/L — SIGNIFICANT CHANGE UP (ref 135–145)
WBC # BLD: 7.08 K/UL — SIGNIFICANT CHANGE UP (ref 3.8–10.5)
WBC # FLD AUTO: 7.08 K/UL — SIGNIFICANT CHANGE UP (ref 3.8–10.5)

## 2018-10-01 PROCEDURE — 93005 ELECTROCARDIOGRAM TRACING: CPT

## 2018-10-01 PROCEDURE — 82962 GLUCOSE BLOOD TEST: CPT

## 2018-10-01 PROCEDURE — 87633 RESP VIRUS 12-25 TARGETS: CPT

## 2018-10-01 PROCEDURE — 96374 THER/PROPH/DIAG INJ IV PUSH: CPT

## 2018-10-01 PROCEDURE — 82803 BLOOD GASES ANY COMBINATION: CPT

## 2018-10-01 PROCEDURE — 87086 URINE CULTURE/COLONY COUNT: CPT

## 2018-10-01 PROCEDURE — 94640 AIRWAY INHALATION TREATMENT: CPT

## 2018-10-01 PROCEDURE — 99232 SBSQ HOSP IP/OBS MODERATE 35: CPT

## 2018-10-01 PROCEDURE — 90686 IIV4 VACC NO PRSV 0.5 ML IM: CPT

## 2018-10-01 PROCEDURE — 85027 COMPLETE CBC AUTOMATED: CPT

## 2018-10-01 PROCEDURE — 96376 TX/PRO/DX INJ SAME DRUG ADON: CPT

## 2018-10-01 PROCEDURE — 71250 CT THORAX DX C-: CPT

## 2018-10-01 PROCEDURE — 99285 EMERGENCY DEPT VISIT HI MDM: CPT | Mod: 25

## 2018-10-01 PROCEDURE — 84295 ASSAY OF SERUM SODIUM: CPT

## 2018-10-01 PROCEDURE — 74176 CT ABD & PELVIS W/O CONTRAST: CPT

## 2018-10-01 PROCEDURE — C8929: CPT

## 2018-10-01 PROCEDURE — G0378: CPT

## 2018-10-01 PROCEDURE — 83036 HEMOGLOBIN GLYCOSYLATED A1C: CPT

## 2018-10-01 PROCEDURE — 76775 US EXAM ABDO BACK WALL LIM: CPT

## 2018-10-01 PROCEDURE — 87486 CHLMYD PNEUM DNA AMP PROBE: CPT

## 2018-10-01 PROCEDURE — 82330 ASSAY OF CALCIUM: CPT

## 2018-10-01 PROCEDURE — 82947 ASSAY GLUCOSE BLOOD QUANT: CPT

## 2018-10-01 PROCEDURE — 83605 ASSAY OF LACTIC ACID: CPT

## 2018-10-01 PROCEDURE — 80053 COMPREHEN METABOLIC PANEL: CPT

## 2018-10-01 PROCEDURE — 84132 ASSAY OF SERUM POTASSIUM: CPT

## 2018-10-01 PROCEDURE — 83735 ASSAY OF MAGNESIUM: CPT

## 2018-10-01 PROCEDURE — 87040 BLOOD CULTURE FOR BACTERIA: CPT

## 2018-10-01 PROCEDURE — 71045 X-RAY EXAM CHEST 1 VIEW: CPT

## 2018-10-01 PROCEDURE — 87581 M.PNEUMON DNA AMP PROBE: CPT

## 2018-10-01 PROCEDURE — 96375 TX/PRO/DX INJ NEW DRUG ADDON: CPT

## 2018-10-01 PROCEDURE — 74018 RADEX ABDOMEN 1 VIEW: CPT

## 2018-10-01 PROCEDURE — 87798 DETECT AGENT NOS DNA AMP: CPT

## 2018-10-01 PROCEDURE — 81001 URINALYSIS AUTO W/SCOPE: CPT

## 2018-10-01 PROCEDURE — 85014 HEMATOCRIT: CPT

## 2018-10-01 PROCEDURE — 82435 ASSAY OF BLOOD CHLORIDE: CPT

## 2018-10-01 PROCEDURE — 80048 BASIC METABOLIC PNL TOTAL CA: CPT

## 2018-10-01 RX ORDER — CEFUROXIME AXETIL 250 MG
1 TABLET ORAL
Qty: 8 | Refills: 0 | OUTPATIENT
Start: 2018-10-01 | End: 2018-10-04

## 2018-10-01 RX ADMIN — CEFEPIME 100 MILLIGRAM(S): 1 INJECTION, POWDER, FOR SOLUTION INTRAMUSCULAR; INTRAVENOUS at 05:25

## 2018-10-01 RX ADMIN — INFLUENZA VIRUS VACCINE 0.5 MILLILITER(S): 15; 15; 15; 15 SUSPENSION INTRAMUSCULAR at 16:54

## 2018-10-01 RX ADMIN — Medication 81 MILLIGRAM(S): at 12:37

## 2018-10-01 RX ADMIN — Medication 100 MILLIGRAM(S): at 05:25

## 2018-10-01 RX ADMIN — Medication 2: at 12:37

## 2018-10-01 RX ADMIN — AMLODIPINE BESYLATE 10 MILLIGRAM(S): 2.5 TABLET ORAL at 05:25

## 2018-10-01 RX ADMIN — SODIUM CHLORIDE 3 MILLILITER(S): 9 INJECTION INTRAMUSCULAR; INTRAVENOUS; SUBCUTANEOUS at 05:22

## 2018-10-01 RX ADMIN — Medication 2: at 08:36

## 2018-10-01 RX ADMIN — Medication 25 MILLIGRAM(S): at 05:25

## 2018-10-01 RX ADMIN — CEFEPIME 100 MILLIGRAM(S): 1 INJECTION, POWDER, FOR SOLUTION INTRAMUSCULAR; INTRAVENOUS at 12:44

## 2018-10-01 RX ADMIN — Medication 2: at 16:54

## 2018-10-01 RX ADMIN — Medication 100 MILLIGRAM(S): at 12:37

## 2018-10-01 RX ADMIN — SODIUM CHLORIDE 3 MILLILITER(S): 9 INJECTION INTRAMUSCULAR; INTRAVENOUS; SUBCUTANEOUS at 12:43

## 2018-10-01 RX ADMIN — LISINOPRIL 40 MILLIGRAM(S): 2.5 TABLET ORAL at 05:25

## 2018-10-01 NOTE — PROGRESS NOTE ADULT - PROVIDER SPECIALTY LIST ADULT
Cardiology
Infectious Disease
Infectious Disease
Internal Medicine
Pulmonology
Urology
Urology
Pulmonology
Pulmonology
Cardiology

## 2018-10-01 NOTE — PROGRESS NOTE ADULT - REASON FOR ADMISSION
flank pain

## 2018-10-01 NOTE — PROGRESS NOTE ADULT - ASSESSMENT
68 yr old was admitted with  renal colic and  hydronephrosis   Treated conservatively and colic has resolved.  NL wbc and initial culture of urine contaminated . Fever on 9/27, and SOB and decreased O2 sats. Chest xray and ct reviewed .  Suspect that the chest abnormalities are more likely related to atelectasis than pna and it is  is from splinting in setting of rhino/enterovirus. Also concerned about urosepsis with recent obstruction.     s doing well    no contraindication to discharge on po ceftin 500 bid to Complete 8 days of ab.

## 2018-10-01 NOTE — PROGRESS NOTE ADULT - SUBJECTIVE AND OBJECTIVE BOX
Patient is a 68y old  Male who presents with a chief complaint of flank pain (01 Oct 2018 08:08)      SUBJECTIVE / OVERNIGHT EVENTS: Comfortable without new complaints.   Review of Systems  chest pain no  palpitations no  sob no  nausea no  headache no    MEDICATIONS  (STANDING):  amLODIPine   Tablet 10 milliGRAM(s) Oral daily  aspirin  chewable 81 milliGRAM(s) Oral daily  atorvastatin 80 milliGRAM(s) Oral at bedtime  cefepime   IVPB      cefepime   IVPB 1000 milliGRAM(s) IV Intermittent every 8 hours  dextrose 5%. 1000 milliLiter(s) (50 mL/Hr) IV Continuous <Continuous>  dextrose 50% Injectable 12.5 Gram(s) IV Push once  dextrose 50% Injectable 25 Gram(s) IV Push once  dextrose 50% Injectable 25 Gram(s) IV Push once  docusate sodium 100 milliGRAM(s) Oral three times a day  influenza   Vaccine 0.5 milliLiter(s) IntraMuscular once  insulin lispro (HumaLOG) corrective regimen sliding scale   SubCutaneous three times a day before meals  insulin lispro (HumaLOG) corrective regimen sliding scale   SubCutaneous at bedtime  lisinopril 40 milliGRAM(s) Oral daily  metoprolol succinate ER 25 milliGRAM(s) Oral daily  senna 2 Tablet(s) Oral at bedtime  sodium chloride 0.9% lock flush 3 milliLiter(s) IV Push every 8 hours  tamsulosin 0.4 milliGRAM(s) Oral at bedtime    MEDICATIONS  (PRN):  acetaminophen   Tablet .. 650 milliGRAM(s) Oral every 6 hours PRN Temp greater or equal to 38.5C (101.3F), Mild Pain (1 - 3)  ALBUTerol/ipratropium for Nebulization 3 milliLiter(s) Nebulizer every 6 hours PRN Shortness of Breath and/or Wheezing  dextrose 40% Gel 15 Gram(s) Oral once PRN Blood Glucose LESS THAN 70 milliGRAM(s)/deciliter  glucagon  Injectable 1 milliGRAM(s) IntraMuscular once PRN Glucose LESS THAN 70 milligrams/deciliter  morphine  - Injectable 2 milliGRAM(s) IV Push every 4 hours PRN Severe Pain (7 - 10)  oxyCODONE    5 mG/acetaminophen 325 mG 1 Tablet(s) Oral every 4 hours PRN Moderate Pain (4 - 6)      Vital Signs Last 24 Hrs  T(C): 36.8 (01 Oct 2018 05:11), Max: 36.9 (30 Sep 2018 14:22)  T(F): 98.2 (01 Oct 2018 05:11), Max: 98.5 (30 Sep 2018 14:22)  HR: 66 (01 Oct 2018 05:11) (66 - 69)  BP: 152/68 (01 Oct 2018 05:11) (145/69 - 161/79)  BP(mean): --  RR: 18 (01 Oct 2018 05:11) (18 - 18)  SpO2: 95% (01 Oct 2018 05:11) (95% - 97%)    PHYSICAL EXAM:  GENERAL: NAD, well-developed  HEAD:  Atraumatic, Normocephalic  EYES: EOMI, PERRLA, conjunctiva and sclera clear  NECK: Supple, No JVD  CHEST/LUNG: Clear to auscultation bilaterally; No wheeze  HEART: Regular rate and rhythm; No murmurs, rubs, or gallops  ABDOMEN: Soft, Nontender, Nondistended; Bowel sounds present  EXTREMITIES:  2+ Peripheral Pulses, No clubbing, cyanosis, or edema  PSYCH: AAOx3  NEUROLOGY: non-focal  SKIN: No rashes or lesions    LABS:                        10.8   7.08  )-----------( 278      ( 01 Oct 2018 09:32 )             32.4     10-01    138  |  101  |  17  ----------------------------<  162<H>  4.1   |  28  |  0.77    Ca    9.6      01 Oct 2018 07:07  Mg     1.7     09-30                  RADIOLOGY & ADDITIONAL TESTS:    Imaging Personally Reviewed:    Consultant(s) Notes Reviewed:      Care Discussed with Consultants/Other Providers:

## 2018-10-01 NOTE — PROGRESS NOTE ADULT - SUBJECTIVE AND OBJECTIVE BOX
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    REVIEW OF SYSTEMS:  Constitutional: As per interval history  HEENT: Within normal limits  CV: As per interval history  Resp: As per interval history  GI: Within normal limits   : nocturia  Musculoskeletal: Within normal limits  Skin: Within normal limits  Neurological: Within normal limits  Psychiatric: Within normal limits  Endocrine: Within normal limits  Hematologic/Lymphatic: Within normal limits  Allergic/Immunologic: Within normal limits      MEDICATIONS:     Pulmonary "  ALBUTerol/ipratropium for Nebulization 3 milliLiter(s) Nebulizer every 6 hours PRN      Anti-microbials:  cefepime   IVPB      cefepime   IVPB 1000 milliGRAM(s) IV Intermittent every 8 hours      Cardiovascular:  amLODIPine   Tablet 10 milliGRAM(s) Oral daily  lisinopril 40 milliGRAM(s) Oral daily  metoprolol succinate ER 25 milliGRAM(s) Oral daily  tamsulosin 0.4 milliGRAM(s) Oral at bedtime      Other:  acetaminophen   Tablet .. 650 milliGRAM(s) Oral every 6 hours PRN  aspirin  chewable 81 milliGRAM(s) Oral daily  atorvastatin 80 milliGRAM(s) Oral at bedtime  dextrose 40% Gel 15 Gram(s) Oral once PRN  dextrose 5%. 1000 milliLiter(s) IV Continuous <Continuous>  dextrose 50% Injectable 12.5 Gram(s) IV Push once  dextrose 50% Injectable 25 Gram(s) IV Push once  dextrose 50% Injectable 25 Gram(s) IV Push once  docusate sodium 100 milliGRAM(s) Oral three times a day  glucagon  Injectable 1 milliGRAM(s) IntraMuscular once PRN  influenza   Vaccine 0.5 milliLiter(s) IntraMuscular once  insulin lispro (HumaLOG) corrective regimen sliding scale   SubCutaneous three times a day before meals  insulin lispro (HumaLOG) corrective regimen sliding scale   SubCutaneous at bedtime  morphine  - Injectable 2 milliGRAM(s) IV Push every 4 hours PRN  oxyCODONE    5 mG/acetaminophen 325 mG 1 Tablet(s) Oral every 4 hours PRN  senna 2 Tablet(s) Oral at bedtime  sodium chloride 0.9% lock flush 3 milliLiter(s) IV Push every 8 hours        OBJECTIVE:    I&O's Detail    30 Sep 2018 07:01  -  01 Oct 2018 07:00  --------------------------------------------------------  IN:    IV PiggyBack: 150 mL    Oral Fluid: 620 mL  Total IN: 770 mL    OUT:    Voided: 200 mL  Total OUT: 200 mL    Total NET: 570 mL    POCT Blood Glucose.: 173 mg/dL (01 Oct 2018 12:24)  POCT Blood Glucose.: 170 mg/dL (01 Oct 2018 07:57)  POCT Blood Glucose.: 159 mg/dL (01 Oct 2018 02:07)  POCT Blood Glucose.: 170 mg/dL (30 Sep 2018 17:00)      PHYSICAL EXAM:       ICU Vital Signs Last 24 Hrs  T(C): 36.9 (01 Oct 2018 13:54), Max: 36.9 (01 Oct 2018 13:54)  T(F): 98.4 (01 Oct 2018 13:54), Max: 98.4 (01 Oct 2018 13:54)  HR: 76 (01 Oct 2018 13:54) (66 - 76)  BP: 134/80 (01 Oct 2018 13:54) (134/80 - 152/68)  BP(mean): --  ABP: --  ABP(mean): --  RR: 18 (01 Oct 2018 13:54) (18 - 18)  SpO2: 95% (01 Oct 2018 13:54) (95% - 97%) on room air     General: Awake. Alert. Cooperative. No distress. Appears stated age 	  HEENT:   Atraumatic. Normocephalic. Anicteric. Normal oral mucosa. PERRL. EOMI. Mallampati class IV airway  Neck: Supple. Trachea midline. Thyroid without enlargement/tenderness/nodules. No carotid bruit. No JVD. Short and wide  Cardiovascular: Regular rate and rhythm. S1 S2 normal. No murmurs, rubs or gallops.  Respiratory: Respirations unlabored. Diffuse rales. No curvature.  Abdomen: Soft. Non-tender. Non-distended. No organomegaly. No masses. Normal bowel sounds.  Extremities: Warm to touch. No clubbing or cyanosis. Mild pretibial edema  Pulses: 2+ peripheral pulses all extremities.	  Skin: Normal skin color. No rashes or lesions. No ecchymoses. No cyanosis. Warm to touch.  Lymph Nodes: Cervical, supraclavicular and axillary nodes normal  Neurological: Motor and sensory examination equal and normal. A and O x 3  Psychiatry: Appropriate mood and affect.    LABS:                        10.8   7.08  )-----------( 278      ( 01 Oct 2018 09:32 )             32.4                         10.9   6.58  )-----------( 252      ( 30 Sep 2018 08:19 )             32.6     10-    138  |  101  |  17  ----------------------------<  162<H>  4.1   |  28  |  0.77    09-30    140  |  103  |  21  ----------------------------<  146<H>  4.0   |  27  |  0.84    Ca      9.6      10-    Ca      9.4      09-30      Mg       1.7     09-30    Mg       1.6     -    < from: TTE with Doppler (w/Cont) (18 @ 07:51) >    Patient name: RAY DUNCAN  YOB: 1950   Age: 68 (M)   MR#: [id number]  Study Date: 2018  Location: [address] [place], [place] [id number]: Марина Morrow [facility]  Study quality: Technically difficult  Referring Physician: Nils Salazar MD  Blood Pressure: 149/65 mmHg  Height: 173 cm  Weight: 91 kg  BSA: 2 m2  ------------------------------------------------------------------------  PROCEDURE: Transthoracic echocardiogram with 2-D, M-Mode  and complete spectral and color flow Doppler. Verbal  consent was obtained for injection of  Ultrasonic Enhancing  Agent following a discussion of risks and benefits.  Following intravenous injection of Ultrasonic Enhancing  Agent , harmonic imaging was performed.  INDICATION: Essential (primary) hypertension (I10)  ------------------------------------------------------------------------  Dimensions:    Normal Values:  LA:     4.4    2.0 - 4.0 cm  Ao:     3.2    2.0 - 3.8 cm  SEPTUM: 1.1    0.6 - 1.2 cm  PWT:    1.0    0.6 - 1.1 cm  LVIDd: 4.5    3.0 - 5.6 cm  LVIDs:  2.6    1.8 - 4.0 cm  Derived variables:  LVMI: 80 g/m2  RWT: 0.44  Fractional short: 42 %  EF (Visual Estimate): 60-65 %  Doppler Peak Velocity (m/sec): AoV=1.3  ------------------------------------------------------------------------  Observations:  Mitral Valve: Normal mitral valve. Minimal mitral  regurgitation.  Aortic Valve/Aorta: Normal trileaflet aortic valve. Peak  transaortic valve gradient equals 7 mm Hg, mean transaortic  valve gradient equals 5 mm Hg, aortic valve velocity time  integral equals 30 cm.  Aortic Root: 3.2 cm.  Left Atrium: Normal left atrium.  Left Ventricle: Normal left ventricular systolic function.   Endocardial visualization enhanced with intravenous  injection of Ultrasonic Enhancing Agent (Definity). Normal  left ventricular internal dimensions and wall thicknesses.  Normal diastolic function  Right Heart: Normal right atrium. Normal right ventricular  size and function. Normal tricuspid valve. Minimal  tricuspid regurgitation. Normal pulmonic valve.  Pericardium/Pleura: Normal pericardium with no pericardial  effusion.  Hemodynamic: Estimated right atrial pressure is 8 mm Hg.  ------------------------------------------------------------------------  Conclusions:  1. Normal left ventricular internal dimensions and wall  thicknesses.  2. Normal left ventricular systolic function.   Endocardial  visualization enhanced with intravenous injection of  Ultrasonic Enhancing Agent (Definity).  *** No previous Echo exam.  ------------------------------------------------------------------------  Confirmed on  2018 - 10:16:30 by Bertin Parra M.D.  ------------------------------------------------------------------------    < end of copied text >  ---------------------------------------------------------------------------------------------------------------  MICROBIOLOGY:     Urinalysis Basic - ( 28 Sep 2018 09:47 )    Color: Yellow / Appearance: Clear / S.011 / pH: x  Gluc: x / Ketone: Negative  / Bili: Negative / Urobili: Negative   Blood: x / Protein: 100 mg/dL / Nitrite: Negative   Leuk Esterase: Negative / RBC: 1 /hpf / WBC x   Sq Epi: x / Non Sq Epi: 0 /hpf / Bacteria: Negative    Culture - Urine (18 @ 03:23)    Specimen Source: .Urine Clean Catch (Midstream)    Culture Results:   <10,000 CFU/ml  Normal Urogenital andrés present    Rapid Respiratory Viral Panel (18 @ 19:44)    Rapid RVP Result: Detected: The Film[employer] RVP Rapid uses polymerase chain reaction (PCR) and melt  curve analysis to screen for adenovirus; coronavirus HKU1, NL63, 229E,  OC43; human metapneumovirus (hMPV); human enterovirus/rhinovirus  (Entero/RV); influenza A; influenza A/H1;influenza A/H3; influenza  A/H1-2009; influenza B; parainfluenza viruses 1, 2, 3, 4; respiratory  syncytial virus; Bordetella pertussis; Mycoplasma pneumoniae; and  Chlamydophila pneumoniae.    Entero/Rhinovirus (RapRVP): Detected    Culture - Blood (18 @ 00:41)    Specimen Source: .Blood Blood-Peripheral    Culture Results:   No growth to date.    Culture - Blood (18 @ 00:41)    Specimen Source: .Blood Blood-Peripheral    Culture Results:   No growth to date.    RADIOLOGY:  [x] Chest radiographs reviewed and interpreted by me    < from: CT Abdomen and Pelvis No Cont (18 @ 22:07) >    EXAM:  CT ABDOMEN AND PELVIS                          PROCEDURE DATE:  2018      INTERPRETATION:  CLINICAL INFORMATION: Left flank pain. The moderate pain   is moderate in severity. Rule out stone.    PROCEDURE:   CT of the Abdomen and Pelvis was performed without intravenous contrast   in the prone position.  Intravenous contrast: None.  Oral contrast: None.  Sagittal and coronal reformats were performed.    COMPARISON: CT chest 2018. CT abdomen pelvis 2018.    FINDINGS:    LOWER CHEST: Bilateral dependent atelectasis. The lung bases are   otherwise clear. Visualized heart is normal in size. Coronary artery   calcifications left circumflex and right coronary arteries.    LIVER: Within normal limits.  BILE DUCTS: Normal caliber.  GALLBLADDER: Within normal limits.  SPLEEN: Within normal limits.  PANCREAS: Within normal limits.  ADRENALS: Within normal limits.  KIDNEYS/URETERS: There are nonobstructing 2 mm calculi in the midpole of   the left kidney. There is mild left periureteral and perinephric   stranding. No calculus is shown along the course of the left ureter. No   hydronephrosis or hydroureter.    The right kidney is normal. No hydronephrosis or hydroureter.    BLADDER: 3 mm layering stone within the bladder.  REPRODUCTIVE ORGANS: The prostate is within normal limits.    BOWEL: The stomach is unremarkable. The small bowel is normal in caliber.   The large bowel is normal in caliber. The appendix is normal. No bowel   obstruction. No free air.  PERITONEUM: No ascites.  VESSELS: Aorta is normal in caliber. Atherosclerotic calcifications.  RETROPERITONEUM: No lymphadenopathy.    ABDOMINAL WALL: Within normal limits.  BONES: Degenerative changes of the spine. 3.2 cm sclerotic lesion in the   rightiliac bone is of uncertain etiology.    IMPRESSION:     1.  A 3 mm stone is within the bladder.   2.  Mild left periureteral and perinephric stranding likely representing   recent passage of stone from the ureter.   3.  Left nephrolithiasis.    HERBERTH MILTON M.D., RADIOLOGY RESIDENT  This document has been electronically signed.  ALISA FOFANA M.D., ATTENDING RADIOLOGIST  This document has been electronically signed. Sep 29 2018 10:57AM      < end of copied text >  ---------------------------------------------------------------------------------------------------------------  < from: Xray Chest 1 View- PORTABLE-Urgent (18 @ 12:03) >    EXAM:  XR CHEST PORTABLE URGENT 1V                          PROCEDURE DATE:  2018      INTERPRETATION:  EXAMINATION: XR CHEST PORTABLE URGENT 1V    CLINICAL INDICATION: R/O pulmonary edema    TECHNIQUE: Single portable view of the chest was obtained.    COMPARISON: Chest radiograph 2018.    FINDINGS:    The cardiomediastinal silhouette is stable. There is no pneumothorax.   There is no pulmonary edema. There are opacities in both lower lobes, new   as compared with the prior study. There are degenerative changes of the   visualized spine.    IMPRESSION:    New opacities in both lower lobes, which may represent atelectasis versus   pneumonia. Correlation with clinical data and follow-up is advised.    KIRAN GODELMAN M.D., ATTENDING RADIOLOGIST  This document has been electronically signed. Sep 27 2018 12:13PM    < end of copied text >  ---------------------------------------------------------------------------------------------------------------    < from: CT Chest No Cont (18 @ 21:38) >    EXAM:  CT CHEST                          PROCEDURE DATE:  2018      INTERPRETATION:  CT CHEST WITHOUT CONTRAST    INDICATION: Shortness of breath, Rales auscultation bilaterally    TECHNIQUE: Unenhanced helical images were obtained ofthe chest. Coronal   and sagittal images were reconstructed. Maximum intensity projection   images were generated.     COMPARISON: No prior chest CT.    FINDINGS:     Lungs, pleura And Airways:     The airways are patent.  Diffuse bronchial wall thickening. No   bronchiectasis.    Peribronchovascular groundglass nodularities within the right upper and   lower lobes. Mild bronchocentric opacity within the basilar left lower   lobe. Multiple bilateral pleural effusions and subsegmental atelectasis   of the basilar lower lobes.    Mediastinum: There are no enlarged chest lymph nodes. The visualized   portion of the thyroid gland is unremarkable.       Heart and Vasculature: The heart is normal in size.  No pericardial   effusion. Coronary artery disease with calcified plaque involving the right   coronary artery and left anterior descending artery. Mild aortic valve   leaflet calcifications..    The main pulmonary artery is not enlarged. No aortic aneurysm.    Upper Abdomen: Calcific atherosclerotic of the aorta and its branches.    Bones And Soft Tissues: The bones are unremarkable.  Bilateral   gynecomastia.    IMPRESSION:     1.  Bronchial wall thickening, right lung peribronchovascular   nodularities and mild bronchocentric opacity within the right lower lobe   possibly representing bronchopneumonia, most likely atypical infection   such as viral. Recommend follow-up in 4 weeks.    2.  Small bilateral pleural effusions.    3.  Coronary atherosclerosis.    STAR PRITCHARD, RADIOLOGY RESIDENT  This document has been electronically signed.  JAVIER KIUMEHR M.D., ATTENDING RADIOLOGIST  This document has been electronically signed. Sep 28 2018 10:55AM      < end of copied text >    ---------------------------------------------------------------------------------------------------------------  < from: CT Abdomen and Pelvis No Cont (18 @ 20:18) >    EXAM:  CT ABDOMEN AND PELVIS                          PROCEDURE DATE:  2018      INTERPRETATION:  CLINICAL INFORMATION:  Left flank pain.  History of   renal stones.    TECHNIQUE:   Axial CT images were acquired through the abdomen and pelvis   Intravenous contrast:  None.  Oral contrast: None.   Coronal and sagittal reformats were generated.     COMPARISON STUDY:  2016.    FINDINGS:  Visualized lower chest: Atherosclerotic calcification of the coronary   arteries.  Mild bilateral gynecomastia.    Liver: Right hepatic lobe measures 21-20 cm in length, unchanged from   2016.  Bile ducts: Normal caliber.  Gallbladder: Within normal limits.  Spleen: Within normal limits.  Pancreas: Within normal limits.  Adrenal glands: Within normal limits.  Kidneys/ureters: Mild left hydroureteronephrosis and perinephric fat   stranding caused by an approximately 5 x 3 mm stone located in the distal   left ureter, immediately proximal to uterovesical junction.  A 3 mm   calcificfocus in the right lower pole (2:56) likely reflects a   nonobstructing renal stone.    Bladder: Within normal limits.  Reproductive organs: The prostate measures approximately 4.3 x 5.5 x 4.5   cm.      Bowel: Small hiatal hernia.  No bowel obstruction.  Normal appendix.    Mild sigmoid diverticulosis.  Peritoneum: No ascites.    Retroperitoneum: Lymphadenopathy.  Vasculature: Scattered atherosclerotic calcifications of the aortoiliac   tree.    Abdominal wall/soft tissues: Within normal limits.  Bones: An approximately 3.2 x 1.1 cm sclerotic focus in the right ilium   (2:91) is grossly stable from 2016.  Degenerative changes in the   spine; no clinically significant spinal canal stenosis is visualized by   CT technique.  Mild bilateral neural foraminal narrowing at L2-L3 through   L5-S1.      IMPRESSION:  Mild left hydroureteronephrosis and perinephric fat stranding caused by   an approximately 5 x 3 mm stone located in the distal left ureter,   immediately proximal to the uterovesical junction.  Superimposed   genitourinary infection should be excluded based on clinical symptoms and   laboratory values.    KAREN LOPEZ M.D.,ATTENDING RADIOLOGIST  This document has been electronically signed. Sep 24 2018  8:41PM     < end of copied text >  ---------------------------------------------------------------------------------------------------------------

## 2018-10-01 NOTE — PROGRESS NOTE ADULT - ASSESSMENT
68 m with  L kidney stone, Renal colic - Stone in bladder. Urology follow.  DC IVF. Fluid load- diurese  Diabetes  - BS control  Hypercholesteremia  - continue meds  Hypertension -continue meds  Coronary calcifications on CT- r/o CAD Cardiology evaluation noted.. Stress test pending as OTP as per Cardiology.   Continue antibiotics, Ceftin 500 bid for 8 days as per ID recommendations   Viral syndrome- supportive care   d/w patient  QA  DC home Follow with Urology/PMD/Cardiology.  Nils Salazar MD pager [phone]

## 2018-10-01 NOTE — PROGRESS NOTE ADULT - ASSESSMENT
ASSESSMENT:    admitted with renal colic - resolved with conservative therapy    isolated fever - RVP (+) for rhinovirus - no evidence of UTI - chest CT with evidence of diffuse bronchial wall thickening and peribronchovascular ground glass nodular opacities suggestive of atypical pneumonia/bronchitis    hypoxemia - multifactorial  1) obesity with restrictive lung disease   2) likely severe underlying obstructive sleep apnea  3) atelectasis due to splinting in the setting of renal colic  4) small pleural effusions  5) rhinoviral infection without bronchospasm    HTN/HLD/DM/calcified coronary arteries on chest CT - r/o significant CAD    PLAN/RECOMMENDATIONS:    stable oxygenation on room air  incentive spirometry  outpatient formal sleep study and full PFTs  weight reduction/exercise encouraged  to complete a 10 day total course of antibiotics with Ceftin  no indication for pulmonary medications   diurese as tolerated by renal function and hemodynamics  cardiac meds: ASA/lipitor/norvasc/lisinopril/toprol XL - outpatient stress test  bowel regimen  glucose control    Will follow with you. Plan of care discussed with the patient and his wife at bedside. Will arrange follow-up with Dr. Juan Balderas in the near future    Gil Corado MD, [facility] - [phone]  Pulmonary Medicine      Gil Corado MD, [facility] - [phone]  Pulmonary Medicine

## 2018-10-01 NOTE — PROGRESS NOTE ADULT - SUBJECTIVE AND OBJECTIVE BOX
Cardiovascular Disease Progress Note  Covering for Dr. rGewal    Overnight events: No acute events overnight. Patient denies chest pain or SOB.   Otherwise review of systems negative    Objective Findings:  T(C): 36.8 (10-01-18 @ 05:11), Max: 36.9 (09-30-18 @ 14:22)  HR: 66 (10-01-18 @ 05:11) (66 - 69)  BP: 152/68 (10-01-18 @ 05:11) (145/69 - 161/79)  RR: 18 (10-01-18 @ 05:11) (18 - 18)  SpO2: 95% (10-01-18 @ 05:11) (95% - 97%)  Wt(kg): --  Daily     Daily       Physical Exam:  Gen: NAD  HEENT: EOMI  CV: RRR, normal S1 + S2, no m/r/g  Lungs: CTAB  Abd: soft, non-tender  Ext: No edema    Telemetry: n/a    Laboratory Data:                        10.9   6.58  )-----------( 252      ( 30 Sep 2018 08:19 )             32.6     10-01    138  |  101  |  17  ----------------------------<  162<H>  4.1   |  28  |  0.77    Ca    9.6      01 Oct 2018 07:07  Mg     1.7     09-30                Inpatient Medications:  MEDICATIONS  (STANDING):  amLODIPine   Tablet 10 milliGRAM(s) Oral daily  aspirin  chewable 81 milliGRAM(s) Oral daily  atorvastatin 80 milliGRAM(s) Oral at bedtime  cefepime   IVPB      cefepime   IVPB 1000 milliGRAM(s) IV Intermittent every 8 hours  dextrose 5%. 1000 milliLiter(s) (50 mL/Hr) IV Continuous <Continuous>  dextrose 50% Injectable 12.5 Gram(s) IV Push once  dextrose 50% Injectable 25 Gram(s) IV Push once  dextrose 50% Injectable 25 Gram(s) IV Push once  docusate sodium 100 milliGRAM(s) Oral three times a day  influenza   Vaccine 0.5 milliLiter(s) IntraMuscular once  insulin lispro (HumaLOG) corrective regimen sliding scale   SubCutaneous three times a day before meals  insulin lispro (HumaLOG) corrective regimen sliding scale   SubCutaneous at bedtime  lisinopril 40 milliGRAM(s) Oral daily  metoprolol succinate ER 25 milliGRAM(s) Oral daily  senna 2 Tablet(s) Oral at bedtime  sodium chloride 0.9% lock flush 3 milliLiter(s) IV Push every 8 hours  tamsulosin 0.4 milliGRAM(s) Oral at bedtime      Assessment: 69 yo male admitted with renal colic secondary to 5mm left distal ureteral calculi and found to have significant coronary calcification on CT. Also complains of exertional shortness of breath      Problem/Plan - 1:  ·  Problem: Renal colic.  Plan: clinically and symptomatically stable   Pain controlled   Urology and ID input noted.      Problem/Plan - 2:  ·  Problem: CAD (coronary artery disease).    Plan: Outpatient SPECT evaluation for ischemia.  ASA.      Problem/Plan - 3:  ·  Problem: Diabetes.  Plan: RISS.      Problem/Plan - 4:  ·  Problem: Hypercholesteremia.  Plan: On statin.      Problem/Plan - 5:  ·  Problem: Hypertension.    Plan: BP mildly elevated.  Observe on current regimen for now.     Over 25 minutes spent on total encounter; more than 50% of the visit was spent counseling and/or coordinating care by the attending physician.      Elias Martinez MD [facility]  Cardiovascular Disease  [phone]

## 2018-10-01 NOTE — PROGRESS NOTE ADULT - SUBJECTIVE AND OBJECTIVE BOX
infectious diseases progress note:    Patient is a 68y old  Male who presents with a chief complaint of flank pain (30 Sep 2018 14:53)        Renal colic        ROS:  CONSTITUTIONAL:  Negative fever or chills, feels well, good appetite  EYES:  Negative  blurry vision or double vision  CARDIOVASCULAR:  Negative for chest pain or palpitations  RESPIRATORY:  Negative for cough, wheezing, or SOB   GASTROINTESTINAL:  Negative for nausea, vomiting, diarrhea, constipation, or abdominal pain  GENITOURINARY:  Negative frequency, urgency or dysuria  NEUROLOGIC:  No headache, confusion, dizziness, lightheadedness    Allergies    No Known Allergies    Intolerances        ANTIBIOTICS/RELEVANT:  antimicrobials  cefepime   IVPB      cefepime   IVPB 1000 milliGRAM(s) IV Intermittent every 8 hours    immunologic:  influenza   Vaccine 0.5 milliLiter(s) IntraMuscular once    OTHER:  acetaminophen   Tablet .. 650 milliGRAM(s) Oral every 6 hours PRN  ALBUTerol/ipratropium for Nebulization 3 milliLiter(s) Nebulizer every 6 hours PRN  amLODIPine   Tablet 10 milliGRAM(s) Oral daily  aspirin  chewable 81 milliGRAM(s) Oral daily  atorvastatin 80 milliGRAM(s) Oral at bedtime  dextrose 40% Gel 15 Gram(s) Oral once PRN  dextrose 5%. 1000 milliLiter(s) IV Continuous <Continuous>  dextrose 50% Injectable 12.5 Gram(s) IV Push once  dextrose 50% Injectable 25 Gram(s) IV Push once  dextrose 50% Injectable 25 Gram(s) IV Push once  docusate sodium 100 milliGRAM(s) Oral three times a day  glucagon  Injectable 1 milliGRAM(s) IntraMuscular once PRN  insulin lispro (HumaLOG) corrective regimen sliding scale   SubCutaneous three times a day before meals  insulin lispro (HumaLOG) corrective regimen sliding scale   SubCutaneous at bedtime  lisinopril 40 milliGRAM(s) Oral daily  metoprolol succinate ER 25 milliGRAM(s) Oral daily  morphine  - Injectable 2 milliGRAM(s) IV Push every 4 hours PRN  oxyCODONE    5 mG/acetaminophen 325 mG 1 Tablet(s) Oral every 4 hours PRN  senna 2 Tablet(s) Oral at bedtime  sodium chloride 0.9% lock flush 3 milliLiter(s) IV Push every 8 hours  tamsulosin 0.4 milliGRAM(s) Oral at bedtime      Objective:  Vital Signs Last 24 Hrs  T(C): 36.8 (01 Oct 2018 05:11), Max: 36.9 (30 Sep 2018 14:22)  T(F): 98.2 (01 Oct 2018 05:11), Max: 98.5 (30 Sep 2018 14:22)  HR: 66 (01 Oct 2018 05:11) (66 - 69)  BP: 152/68 (01 Oct 2018 05:11) (145/69 - 161/79)  BP(mean): --  RR: 18 (01 Oct 2018 05:11) (18 - 18)  SpO2: 95% (01 Oct 2018 05:11) (95% - 97%)       Eyes:MANAV, EOMI  Ear/Nose/Throat: no oral lesion, no sinus tenderness on percussion	  Neck:no JVD, no lymphadenopathy, supple  Respiratory: CTA bhavani  Cardiovascular: S1S2 RRR, no murmurs  Gastrointestinal:soft, (+) BS, no HSM  Extremities:no e/e/c        LABS:                        10.9   6.58  )-----------( 252      ( 30 Sep 2018 08:19 )             32.6     09-30    140  |  103  |  21  ----------------------------<  146<H>  4.0   |  27  |  0.84    Ca    9.4      30 Sep 2018 06:54  Mg     1.7     09-30              MICROBIOLOGY:    RECENT CULTURES:  09-28 @ 00:41 .Blood Blood-Peripheral                No growth to date.    09-25 @ 03:23 .Urine Clean Catch (Midstream)                <10,000 CFU/ml  Normal Urogenital andrés present          RESPIRATORY CULTURES:              RADIOLOGY & ADDITIONAL STUDIES:        Pager [phone]  After 5 pm/weekends or if no response :[phone]

## 2018-10-03 LAB
CULTURE RESULTS: SIGNIFICANT CHANGE UP
CULTURE RESULTS: SIGNIFICANT CHANGE UP
SPECIMEN SOURCE: SIGNIFICANT CHANGE UP
SPECIMEN SOURCE: SIGNIFICANT CHANGE UP

## 2019-01-10 NOTE — ED ADULT TRIAGE NOTE - CCCP TRG CHIEF CMPLNT
[FreeTextEntry1] : 57 year old F 1 WEEK s/p removal of lap and and port secondary to lap band erosion. Pt saw GI - underwent an upper EGD - which confirmed a lap band erosion. Gained 9 lbs  since last visit.\par \par Call for any questions or concerns.\par \par Nutritional counseling has been provided. The patient is encouraged to remain calorie conscious and continue a low fat, low carbohydrate, protein focus diet. Pt encouraged to participate in a daily exercise regimen incorporating cardio and  strength training. \par \par Dr. Garcia saw patient. \par \par Return to office in 1 weeks or earlier if any concerns.Plan to return to work within next 1-2 weeks. \par  
flu-like symptoms

## 2021-06-10 NOTE — ED PROVIDER NOTE - CPE EDP RESP NORM
Spoke with patient's NP Lauren. Already reviewed case with Dr. Christianson. -ejb    ----- Message from Roldan Barajas MD sent at 8/21/2020 12:22 PM CDT -----  Sheila,    Would offer her a new patient visit next week.    ----- Message -----  From: Sheila Chavez RN  Sent: 8/21/2020   9:35 AM CDT  To: Roldan Barajas MD    Cardiology has never seen this patient. Looks like you spoke with Midwife and recommended EKG, which was done yesterday. Midwife is calling for further guidance...  ----- Message -----  From: Benita Barney  Sent: 8/21/2020   9:29 AM CDT  To: Sheila Chavez, RN      Primary cardiologist: Dr. Barajas    Detailed reason for call: Susan the patient's midwife is calling because they need to know next steps for patient's delivery. Please advise [phone]    Device? no    Additional Info:              
normal...

## 2021-07-29 PROBLEM — N23 UNSPECIFIED RENAL COLIC: Chronic | Status: ACTIVE | Noted: 2018-09-24

## 2021-10-06 ENCOUNTER — APPOINTMENT (OUTPATIENT)
Dept: OPHTHALMOLOGY | Facility: CLINIC | Age: 71
End: 2021-10-06

## 2022-04-22 NOTE — ED ADULT TRIAGE NOTE - WEIGHT METHOD
Depth In Mm (Will Not Render If 0): 0 Price (Use Numbers Only, No Special Characters Or $): 200 Location #1: scar of left forehead Standard Default Technique For Making Prp: Pt was advised on adequate hydration and eating 24 hours before procedure.  Pt was brought in and in sterile fashion blood was drawn into eclipse 11ml tube with butterfly needle.  Tube was mixed adequately and was spun at 3300rpm for a total of 10 min.  PRP was then drawn up in sterile fashion to be used with subcision.  3 scars on left forehead treated with subcision 30g 1/2in needle and prp injected. Treatment Number (Optional): 1 Detail Level: Zone Which Technique?: Default Show Additional Techniques: Yes Post-Care Instructions: After the procedure, take precautions against sun exposure. Consent: Written consent obtained, risks reviewed including but not limited to pain, bruising, scarring, infection, vascular events and incomplete improvement.  Patient understands the procedure is cosmetic in nature and will require out of pocket payment. Topical Anesthesia Type: BLT gel (benzocaine 20%, lidocaine 6%, tetracaine 4%) Venipuncture Paragraph: An alcohol pad was applied to the venipuncture site. Venipuncture was performed using a butterfly needle. Pressure and a bandaid was applied to the site. No complications were noted. Length Of Topical Anesthesia Application (Optional): 20 minutes stated

## 2022-10-16 ENCOUNTER — EMERGENCY (EMERGENCY)
Facility: HOSPITAL | Age: 72
LOS: 1 days | Discharge: HOME | End: 2022-10-16
Attending: STUDENT IN AN ORGANIZED HEALTH CARE EDUCATION/TRAINING PROGRAM
Payer: COMMERCIAL

## 2022-10-16 VITALS
SYSTOLIC BLOOD PRESSURE: 143 MMHG | RESPIRATION RATE: 19 BRPM | WEIGHT: 160.06 LBS | HEART RATE: 89 BPM | DIASTOLIC BLOOD PRESSURE: 72 MMHG | HEIGHT: 68 IN | OXYGEN SATURATION: 95 % | TEMPERATURE: 99 F

## 2022-10-16 VITALS
OXYGEN SATURATION: 95 % | HEART RATE: 84 BPM | DIASTOLIC BLOOD PRESSURE: 82 MMHG | TEMPERATURE: 99 F | RESPIRATION RATE: 18 BRPM | SYSTOLIC BLOOD PRESSURE: 149 MMHG

## 2022-10-16 LAB
ALBUMIN SERPL ELPH-MCNC: 3.5 G/DL — SIGNIFICANT CHANGE UP (ref 3.5–5)
ALP SERPL-CCNC: 116 U/L — SIGNIFICANT CHANGE UP (ref 40–120)
ALT FLD-CCNC: 18 U/L DA — SIGNIFICANT CHANGE UP (ref 10–60)
ANION GAP SERPL CALC-SCNC: 6 MMOL/L — SIGNIFICANT CHANGE UP (ref 5–17)
APPEARANCE UR: CLEAR — SIGNIFICANT CHANGE UP
APTT BLD: 33.9 SEC — SIGNIFICANT CHANGE UP (ref 27.5–35.5)
AST SERPL-CCNC: 10 U/L — SIGNIFICANT CHANGE UP (ref 10–40)
BACTERIA # UR AUTO: ABNORMAL /HPF
BASOPHILS # BLD AUTO: 0.02 K/UL — SIGNIFICANT CHANGE UP (ref 0–0.2)
BASOPHILS NFR BLD AUTO: 0.2 % — SIGNIFICANT CHANGE UP (ref 0–2)
BILIRUB SERPL-MCNC: 0.7 MG/DL — SIGNIFICANT CHANGE UP (ref 0.2–1.2)
BILIRUB UR-MCNC: NEGATIVE — SIGNIFICANT CHANGE UP
BUN SERPL-MCNC: 14 MG/DL — SIGNIFICANT CHANGE UP (ref 7–18)
CALCIUM SERPL-MCNC: 8.7 MG/DL — SIGNIFICANT CHANGE UP (ref 8.4–10.5)
CHLORIDE SERPL-SCNC: 106 MMOL/L — SIGNIFICANT CHANGE UP (ref 96–108)
CO2 SERPL-SCNC: 25 MMOL/L — SIGNIFICANT CHANGE UP (ref 22–31)
COLOR SPEC: YELLOW — SIGNIFICANT CHANGE UP
CREAT SERPL-MCNC: 0.87 MG/DL — SIGNIFICANT CHANGE UP (ref 0.5–1.3)
DIFF PNL FLD: NEGATIVE — SIGNIFICANT CHANGE UP
EGFR: 93 ML/MIN/1.73M2 — SIGNIFICANT CHANGE UP
EOSINOPHIL # BLD AUTO: 0 K/UL — SIGNIFICANT CHANGE UP (ref 0–0.5)
EOSINOPHIL NFR BLD AUTO: 0 % — SIGNIFICANT CHANGE UP (ref 0–6)
EPI CELLS # UR: ABNORMAL /HPF
GLUCOSE SERPL-MCNC: 106 MG/DL — HIGH (ref 70–99)
GLUCOSE UR QL: 1000 MG/DL
HCT VFR BLD CALC: 42 % — SIGNIFICANT CHANGE UP (ref 39–50)
HGB BLD-MCNC: 13.6 G/DL — SIGNIFICANT CHANGE UP (ref 13–17)
IMM GRANULOCYTES NFR BLD AUTO: 0.3 % — SIGNIFICANT CHANGE UP (ref 0–0.9)
INR BLD: 1.12 RATIO — SIGNIFICANT CHANGE UP (ref 0.88–1.16)
KETONES UR-MCNC: NEGATIVE — SIGNIFICANT CHANGE UP
LEUKOCYTE ESTERASE UR-ACNC: NEGATIVE — SIGNIFICANT CHANGE UP
LYMPHOCYTES # BLD AUTO: 1.19 K/UL — SIGNIFICANT CHANGE UP (ref 1–3.3)
LYMPHOCYTES # BLD AUTO: 12.1 % — LOW (ref 13–44)
MCHC RBC-ENTMCNC: 29.6 PG — SIGNIFICANT CHANGE UP (ref 27–34)
MCHC RBC-ENTMCNC: 32.4 GM/DL — SIGNIFICANT CHANGE UP (ref 32–36)
MCV RBC AUTO: 91.5 FL — SIGNIFICANT CHANGE UP (ref 80–100)
MONOCYTES # BLD AUTO: 0.99 K/UL — HIGH (ref 0–0.9)
MONOCYTES NFR BLD AUTO: 10.1 % — SIGNIFICANT CHANGE UP (ref 2–14)
NEUTROPHILS # BLD AUTO: 7.59 K/UL — HIGH (ref 1.8–7.4)
NEUTROPHILS NFR BLD AUTO: 77.3 % — HIGH (ref 43–77)
NITRITE UR-MCNC: NEGATIVE — SIGNIFICANT CHANGE UP
NRBC # BLD: 0 /100 WBCS — SIGNIFICANT CHANGE UP (ref 0–0)
PH UR: 5 — SIGNIFICANT CHANGE UP (ref 5–8)
PLATELET # BLD AUTO: 222 K/UL — SIGNIFICANT CHANGE UP (ref 150–400)
POTASSIUM SERPL-MCNC: 3.8 MMOL/L — SIGNIFICANT CHANGE UP (ref 3.5–5.3)
POTASSIUM SERPL-SCNC: 3.8 MMOL/L — SIGNIFICANT CHANGE UP (ref 3.5–5.3)
PROT SERPL-MCNC: 7.3 G/DL — SIGNIFICANT CHANGE UP (ref 6–8.3)
PROT UR-MCNC: 100
PROTHROM AB SERPL-ACNC: 13.4 SEC — SIGNIFICANT CHANGE UP (ref 10.5–13.4)
RBC # BLD: 4.59 M/UL — SIGNIFICANT CHANGE UP (ref 4.2–5.8)
RBC # FLD: 13.5 % — SIGNIFICANT CHANGE UP (ref 10.3–14.5)
RBC CASTS # UR COMP ASSIST: SIGNIFICANT CHANGE UP /HPF (ref 0–2)
SODIUM SERPL-SCNC: 137 MMOL/L — SIGNIFICANT CHANGE UP (ref 135–145)
SP GR SPEC: 1.01 — SIGNIFICANT CHANGE UP (ref 1.01–1.02)
UROBILINOGEN FLD QL: NEGATIVE — SIGNIFICANT CHANGE UP
WBC # BLD: 9.82 K/UL — SIGNIFICANT CHANGE UP (ref 3.8–10.5)
WBC # FLD AUTO: 9.82 K/UL — SIGNIFICANT CHANGE UP (ref 3.8–10.5)
WBC UR QL: SIGNIFICANT CHANGE UP /HPF (ref 0–5)

## 2022-10-16 PROCEDURE — 71045 X-RAY EXAM CHEST 1 VIEW: CPT

## 2022-10-16 PROCEDURE — 99284 EMERGENCY DEPT VISIT MOD MDM: CPT | Mod: CS

## 2022-10-16 PROCEDURE — 87086 URINE CULTURE/COLONY COUNT: CPT

## 2022-10-16 PROCEDURE — 85730 THROMBOPLASTIN TIME PARTIAL: CPT

## 2022-10-16 PROCEDURE — 80053 COMPREHEN METABOLIC PANEL: CPT

## 2022-10-16 PROCEDURE — 93010 ELECTROCARDIOGRAM REPORT: CPT

## 2022-10-16 PROCEDURE — 71045 X-RAY EXAM CHEST 1 VIEW: CPT | Mod: 26

## 2022-10-16 PROCEDURE — 99285 EMERGENCY DEPT VISIT HI MDM: CPT | Mod: 25

## 2022-10-16 PROCEDURE — 36415 COLL VENOUS BLD VENIPUNCTURE: CPT

## 2022-10-16 PROCEDURE — 93005 ELECTROCARDIOGRAM TRACING: CPT

## 2022-10-16 PROCEDURE — 85025 COMPLETE CBC W/AUTO DIFF WBC: CPT

## 2022-10-16 PROCEDURE — 85610 PROTHROMBIN TIME: CPT

## 2022-10-16 PROCEDURE — 81001 URINALYSIS AUTO W/SCOPE: CPT

## 2022-10-16 NOTE — ED PROVIDER NOTE - NSFOLLOWUPINSTRUCTIONS_ED_ALL_ED_FT
You were seen in the emergency department for: fevers  Your diagnosis for this visit was: COVID  Your results report is attached.  We recommend you follow up with your primary care doctor.    Please return to the Emergency Department if you experience any of the following symptoms:   - Shortness of breath or trouble breathing  - Pressure, pain or tightness in the chest  - Face drooping, arm weakness or speech difficulty  - Persistence of severe vomiting  - Head injury or loss of consciousness  - Nonstop bleeding or an open wound    (1) Follow up with your primary care physician within the next 24-48 hours as discussed. In addition, we did not find evidence of a life threatening illness on your testing here today, but listed below are the specialists that will be necessary to see as an outpatient to continue the workup.  Please call the numbers listed below or [phone] to set up the necessary appointments.  (2) Take Tylenol (up to 1000mg or 1 g)  and/or Motrin (up to 600mg) up to every 6 hours as needed for pain.   (3) If you had an IV (intravenous) line placed, it was removed. Sometimes, after IV removal, that area can be tender for a few days; if it develops redness and swelling, those could be signs of infection; in which case, return to the Emergency Department for assessment.  (4) Please continue taking all of your home medications as directed.

## 2022-10-16 NOTE — ED ADULT NURSE NOTE - OBJECTIVE STATEMENT
Patient came to ED with c/o cough x 2 days. Tested COVID at home using home kit with positive result yesterday. NO SOB noted upon ED visit.

## 2022-10-16 NOTE — ED ADULT NURSE NOTE - NEURO ASSESSMENT
----- Message from Doug Fish sent at 4/14/2022  3:30 PM EDT -----  Subject: Refill Request    QUESTIONS  Name of Medication? amphetamine-dextroamphetamine (ADDERALL, 10MG,) 10 MG   tablet  Patient-reported dosage and instructions? 10 mg 3 times daily  How many days do you have left? 2  Preferred Pharmacy? [id number]  Pharmacy phone number (if available)? [phone]  ---------------------------------------------------------------------------  --------------  Jarvis PIRES  What is the best way for the office to contact you? OK to leave message on   voicemail  Preferred Call Back Phone Number? [phone]  ---------------------------------------------------------------------------  --------------  SCRIPT ANSWERS  Relationship to Patient?  Self
Medication sent
Medication:   Requested Prescriptions     Pending Prescriptions Disp Refills    amphetamine-dextroamphetamine (ADDERALL, 10MG,) 10 MG tablet 90 tablet 0     Sig: Take 1 tablet by mouth 3 times daily for 30 days. Last Filled:  2/24/22    Patient Phone Number: [phone] (home)     Last appt: 3/4/2022   Next appt: 9/9/2022    Last OARRS:   RX Monitoring 3/4/2022   Attestation -   Periodic Controlled Substance Monitoring No signs of potential drug abuse or diversion identified.
- - -

## 2022-10-16 NOTE — ED PROVIDER NOTE - CLINICAL SUMMARY MEDICAL DECISION MAKING FREE TEXT BOX
72-year-old male hx of HTN, HLD, presenting with fevers, cough, weakness x 2 days. Labs and CXR wnl. Patient tolerating PO intake. Supportive care recs, return precautions provided. Will discharge.

## 2022-10-16 NOTE — ED PROVIDER NOTE - OBJECTIVE STATEMENT
72-year-old male hx of HTN, HLD, presenting with fevers, cough, weakness x 2 days. Tested positive for COVID 2 days ago. No chest pain or shortness of breath.

## 2022-10-18 LAB
CULTURE RESULTS: SIGNIFICANT CHANGE UP
SPECIMEN SOURCE: SIGNIFICANT CHANGE UP

## 2023-02-04 NOTE — ED ADULT NURSE NOTE - CAS EDN DISCHARGE INTERVENTIONS
EEG Electrode Placement    Procedure Performed: STAT LTM    Interpreting Physician:  DR. CAMPBELL    Patient Room:      Measurements:  10-20 System    Supplies: Glue -  Collodion and Paste -  Ten20    Skin was prepped with Cavilon wipe.  Foam dressing applied behind each electrode on exposed skin to minimize skin irritation.         Arm band on

## 2024-03-26 NOTE — ED ADULT NURSE NOTE - RELIEVING FACTORS
[TextEntry] : All medical record entries have been made by the scribe, BARRETT HAQUE, at Dr. Binh Dc direction and personally dictated by me on 3/25/24. I have received the chart and agree that the record accurately reflects my personal performance of the history, physical exam, assessment, and plan. I have also personally directed, reviewed and agreed with the chart.
none

## 2024-05-28 NOTE — ED PROVIDER NOTE - NS ED NOTE AC HIGH RISK COUNTRIES
I reviewed the H&P, I examined the patient, and there are no changes in the patient's condition. Localized right axilla.
No

## 2025-03-19 ENCOUNTER — APPOINTMENT (OUTPATIENT)
Age: 75
End: 2025-03-19

## 2025-06-14 NOTE — ED ADULT NURSE NOTE - NS ED NURSE RECORD ANOTHER HT AND WT
06/14/25 0729   Vital Signs   Temp 97.9 °F (36.6 °C)   Temp Source Oral   Pulse 79   Heart Rate Source Monitor   Respirations 16   /72   MAP (Calculated) 89   BP Location Left upper arm   BP Method Automatic   Pain Assessment   Pain Assessment 0-10   Pain Level 4   Patient's Stated Pain Goal 0 - No pain   Pain Location Back   Pain Orientation Lower   Pain Descriptors Aching   Pain Type Chronic pain   Pain Frequency Intermittent   Pain Onset Gradual   Non-Pharmaceutical Pain Intervention(s) Repositioned   Oxygen Therapy   SpO2 94 %   O2 Device Nasal cannula   O2 Flow Rate (L/min) 2 L/min     Alert and oriented. Skin wd. Resp ee unlabored. Meds given per peg tube. Placement verified via gastric contents.  Dressing changed. Message sent to MD regarding Left wrist/hand edema.  No ss distress noted. Patient ambulated to bathroom. Tolerated well. See flow sheet and MAR. Up in chair.    Yes